# Patient Record
Sex: FEMALE | Race: WHITE | Employment: FULL TIME | URBAN - METROPOLITAN AREA
[De-identification: names, ages, dates, MRNs, and addresses within clinical notes are randomized per-mention and may not be internally consistent; named-entity substitution may affect disease eponyms.]

---

## 2018-03-26 LAB — TSH SERPL DL<=0.05 MIU/L-ACNC: 0.45 UIU/ML

## 2018-03-29 ENCOUNTER — OFFICE VISIT (OUTPATIENT)
Dept: PRIMARY CARE CLINIC | Age: 58
End: 2018-03-29
Payer: COMMERCIAL

## 2018-03-29 ENCOUNTER — TELEPHONE (OUTPATIENT)
Dept: ADMINISTRATIVE | Age: 58
End: 2018-03-29

## 2018-03-29 VITALS
HEART RATE: 74 BPM | OXYGEN SATURATION: 98 % | WEIGHT: 146 LBS | TEMPERATURE: 97.7 F | HEIGHT: 67 IN | BODY MASS INDEX: 22.91 KG/M2 | DIASTOLIC BLOOD PRESSURE: 60 MMHG | SYSTOLIC BLOOD PRESSURE: 102 MMHG

## 2018-03-29 DIAGNOSIS — J45.40 MODERATE PERSISTENT ASTHMATIC BRONCHITIS WITHOUT COMPLICATION: Primary | ICD-10-CM

## 2018-03-29 DIAGNOSIS — J45.30 MILD PERSISTENT ASTHMA WITHOUT COMPLICATION: ICD-10-CM

## 2018-03-29 PROCEDURE — 99213 OFFICE O/P EST LOW 20 MIN: CPT | Performed by: FAMILY MEDICINE

## 2018-03-29 RX ORDER — AZITHROMYCIN 250 MG/1
TABLET, FILM COATED ORAL
Qty: 1 PACKET | Refills: 0 | Status: SHIPPED | OUTPATIENT
Start: 2018-03-29 | End: 2018-05-25 | Stop reason: ALTCHOICE

## 2018-03-29 RX ORDER — PREDNISONE 10 MG/1
TABLET ORAL
Qty: 21 TABLET | Refills: 0 | Status: SHIPPED | OUTPATIENT
Start: 2018-03-29 | End: 2018-05-25 | Stop reason: ALTCHOICE

## 2018-03-29 ASSESSMENT — ENCOUNTER SYMPTOMS
SINUS PRESSURE: 0
NAUSEA: 0
EYE ITCHING: 0
BLOOD IN STOOL: 0
SINUS PAIN: 0
DIARRHEA: 0
CHEST TIGHTNESS: 1
SPUTUM PRODUCTION: 1
SHORTNESS OF BREATH: 1
RHINORRHEA: 0
CONSTIPATION: 0
WHEEZING: 1
VOMITING: 0
SORE THROAT: 1
ORTHOPNEA: 0
COUGH: 1
ABDOMINAL PAIN: 0

## 2018-03-29 NOTE — PROGRESS NOTES
swelling and PND. Gastrointestinal: Negative for abdominal pain, blood in stool, constipation, diarrhea, nausea and vomiting. Musculoskeletal: Negative for neck pain. Neurological: Negative for headaches. All other review of systems reviewed and are negative    OBJECTIVE:  /60   Pulse 74   Temp 97.7 °F (36.5 °C)   Ht 5' 6.5\" (1.689 m)   Wt 146 lb (66.2 kg)   LMP  (Approximate)   SpO2 98%   BMI 23.21 kg/m²      Physical Exam   Constitutional: She is oriented to person, place, and time. She appears well-developed and well-nourished. HENT:   Head: Normocephalic and atraumatic. Eyes: Pupils are equal, round, and reactive to light. Neck: Normal range of motion. Neck supple. No JVD present. No tracheal deviation present. No thyromegaly present. Cardiovascular: Normal rate, regular rhythm and normal heart sounds. Exam reveals no gallop and no friction rub. No murmur heard. Pulmonary/Chest: Effort normal and breath sounds normal. No respiratory distress. She has no wheezes. She has no rales. She exhibits no tenderness. Abdominal: Soft. Bowel sounds are normal. She exhibits no distension and no mass. There is no tenderness. There is no rebound and no guarding. Musculoskeletal: Normal range of motion. Lymphadenopathy:     She has no cervical adenopathy. Neurological: She is alert and oriented to person, place, and time. Skin: Skin is warm and dry. ASSESSMENT AND PLAN:    Emily Lynch was seen today for shortness of breath, asthma and wheezing. Diagnoses and all orders for this visit:    Moderate persistent asthmatic bronchitis without complication  -     azithromycin (ZITHROMAX Z-PEACE) 250 MG tablet; Take 2 pills on day 1, then 1 pill days 2-5. Mild persistent asthma without complication  -     predniSONE (DELTASONE) 10 MG tablet; 4 daily for 2 days, then 3 daily for 2 days, then 2 daily for 2 days, then 1 daily for 2 days, then 1/2 daily for 2days.     - continue advair    Return if symptoms worsen or fail to improve. I reviewed the students documentation ( Hx, exam, MDM ), examined the patient and performed the A&P.     Wocjiech Irvin, DO

## 2018-05-25 ENCOUNTER — OFFICE VISIT (OUTPATIENT)
Dept: PRIMARY CARE CLINIC | Age: 58
End: 2018-05-25
Payer: COMMERCIAL

## 2018-05-25 VITALS
BODY MASS INDEX: 24.48 KG/M2 | SYSTOLIC BLOOD PRESSURE: 110 MMHG | WEIGHT: 154 LBS | DIASTOLIC BLOOD PRESSURE: 80 MMHG | HEART RATE: 66 BPM | OXYGEN SATURATION: 97 % | TEMPERATURE: 98.2 F

## 2018-05-25 DIAGNOSIS — J45.30 MILD PERSISTENT ASTHMA WITHOUT COMPLICATION: Primary | ICD-10-CM

## 2018-05-25 DIAGNOSIS — F41.1 GAD (GENERALIZED ANXIETY DISORDER): ICD-10-CM

## 2018-05-25 PROCEDURE — 90732 PPSV23 VACC 2 YRS+ SUBQ/IM: CPT | Performed by: FAMILY MEDICINE

## 2018-05-25 PROCEDURE — 99213 OFFICE O/P EST LOW 20 MIN: CPT | Performed by: FAMILY MEDICINE

## 2018-05-25 PROCEDURE — 90471 IMMUNIZATION ADMIN: CPT | Performed by: FAMILY MEDICINE

## 2018-05-25 ASSESSMENT — ENCOUNTER SYMPTOMS
SHORTNESS OF BREATH: 0
DIFFICULTY BREATHING: 0
CHEST TIGHTNESS: 0
WHEEZING: 0
COUGH: 0

## 2018-05-25 ASSESSMENT — PATIENT HEALTH QUESTIONNAIRE - PHQ9
2. FEELING DOWN, DEPRESSED OR HOPELESS: 0
SUM OF ALL RESPONSES TO PHQ QUESTIONS 1-9: 0
1. LITTLE INTEREST OR PLEASURE IN DOING THINGS: 0
SUM OF ALL RESPONSES TO PHQ9 QUESTIONS 1 & 2: 0

## 2018-11-30 ENCOUNTER — OFFICE VISIT (OUTPATIENT)
Dept: PRIMARY CARE CLINIC | Age: 58
End: 2018-11-30
Payer: COMMERCIAL

## 2018-11-30 VITALS
WEIGHT: 152.5 LBS | OXYGEN SATURATION: 98 % | TEMPERATURE: 97.8 F | HEART RATE: 76 BPM | BODY MASS INDEX: 24.51 KG/M2 | DIASTOLIC BLOOD PRESSURE: 80 MMHG | SYSTOLIC BLOOD PRESSURE: 120 MMHG | HEIGHT: 66 IN

## 2018-11-30 DIAGNOSIS — J01.30 ACUTE NON-RECURRENT SPHENOIDAL SINUSITIS: ICD-10-CM

## 2018-11-30 DIAGNOSIS — J02.9 PHARYNGITIS, UNSPECIFIED ETIOLOGY: Primary | ICD-10-CM

## 2018-11-30 DIAGNOSIS — F41.1 GAD (GENERALIZED ANXIETY DISORDER): ICD-10-CM

## 2018-11-30 PROCEDURE — 99213 OFFICE O/P EST LOW 20 MIN: CPT | Performed by: FAMILY MEDICINE

## 2018-11-30 RX ORDER — AZITHROMYCIN 250 MG/1
TABLET, FILM COATED ORAL
Qty: 1 PACKET | Refills: 0 | Status: SHIPPED | OUTPATIENT
Start: 2018-11-30 | End: 2019-03-15

## 2018-11-30 RX ORDER — AZITHROMYCIN 250 MG/1
TABLET, FILM COATED ORAL
Qty: 1 PACKET | Refills: 0 | Status: SHIPPED | OUTPATIENT
Start: 2018-11-30 | End: 2018-11-30

## 2018-11-30 ASSESSMENT — ENCOUNTER SYMPTOMS
SWOLLEN GLANDS: 1
SORE THROAT: 1
SINUS PRESSURE: 1
SHORTNESS OF BREATH: 0
COUGH: 1
WHEEZING: 0

## 2018-11-30 NOTE — PROGRESS NOTES
Lane Oliva, a female of 66 y.o. came to the office 11/30/2018. Patient Active Problem List   Diagnosis    Mild persistent asthma without complication          Pharyngitis   This is a new problem. The current episode started in the past 7 days (6). The problem has been gradually worsening. Associated symptoms include coughing (mostly dry but with some mucus in am that is light yellow. ), fatigue, myalgias, a sore throat (and pain with swallowing and eating and drinking. ) and swollen glands. Pertinent negatives include no chills, congestion, fever or rash. Treatments tried: claritin, tylenol cold. Generalized Body Aches   Associated symptoms include coughing (mostly dry but with some mucus in am that is light yellow. ), fatigue, myalgias, a sore throat (and pain with swallowing and eating and drinking. ) and swollen glands. Pertinent negatives include no chills, congestion, fever or rash. psych: moods doing well on Zoloft. Allergies   Allergen Reactions    Pcn [Penicillins]     Sulfa Antibiotics        Current Outpatient Prescriptions on File Prior to Visit   Medication Sig Dispense Refill    ADVAIR DISKUS 100-50 MCG/DOSE diskus inhaler USE 1 INHALATION TWICE A  each 1    SYNTHROID 137 MCG tablet Take 137 mcg by mouth Daily       albuterol (PROVENTIL) (2.5 MG/3ML) 0.083% nebulizer solution Take 3 mLs by nebulization every 6 hours as needed for Wheezing or Shortness of Breath 360 mL 0    albuterol sulfate HFA (PROVENTIL HFA) 108 (90 BASE) MCG/ACT inhaler Inhale 2 puffs into the lungs every 6 hours as needed for Wheezing 1 Inhaler 3    vitamin D (CHOLECALCIFEROL) 1000 UNIT TABS tablet Take 1,000 Units by mouth daily      Multiple Vitamin (THERA) TABS Take  by mouth.  Calcium Carbonate-Vit D-Min (CALCIUM 1200 PO) Take  by mouth. No current facility-administered medications on file prior to visit. Review of Systems   Constitutional: Positive for fatigue.  Negative for chills and fever. HENT: Positive for postnasal drip, sinus pressure and sore throat (and pain with swallowing and eating and drinking. ). Negative for congestion. Respiratory: Positive for cough (mostly dry but with some mucus in am that is light yellow. ). Negative for shortness of breath and wheezing. Musculoskeletal: Positive for myalgias. Skin: Negative for rash. All other review of systems reviewed and are negative    OBJECTIVE:  /80 (Site: Left Upper Arm, Position: Sitting, Cuff Size: Large Adult)   Pulse 76   Temp 97.8 °F (36.6 °C) (Infrared)   Ht 5' 6\" (1.676 m)   Wt 152 lb 8 oz (69.2 kg)   LMP  (Approximate)   SpO2 98%   BMI 24.61 kg/m²      Physical Exam   Constitutional: No distress. HENT:   Right Ear: Tympanic membrane normal.   Left Ear: Tympanic membrane normal.   Nose: Mucosal edema (left with clear mucus. ) present. No rhinorrhea. Right sinus exhibits maxillary sinus tenderness. Right sinus exhibits no frontal sinus tenderness. Left sinus exhibits maxillary sinus tenderness. Left sinus exhibits no frontal sinus tenderness. Mouth/Throat: Uvula is midline, oropharynx is clear and moist and mucous membranes are normal. No oropharyngeal exudate or posterior oropharyngeal erythema. Neck: Neck supple. Cardiovascular: Normal rate and regular rhythm. Pulmonary/Chest: Effort normal and breath sounds normal.   Lymphadenopathy:     She has no cervical adenopathy. ASSESSMENT AND PLAN:    Elba Lewis was seen today for hypothyroidism, pharyngitis and generalized body aches. Diagnoses and all orders for this visit:    Pharyngitis, unspecified etiology    JACEY (generalized anxiety disorder)  -     sertraline (ZOLOFT) 50 MG tablet; TAKE 1 TABLET DAILY    Acute non-recurrent sphenoidal sinusitis  -     Discontinue: azithromycin (ZITHROMAX Z-PEACE) 250 MG tablet; Take 2 pills on day 1, then 1 pill days 2-5.  -     azithromycin (ZITHROMAX Z-PEACE) 250 MG tablet;  Take 2 pills on day

## 2018-12-03 ENCOUNTER — TELEPHONE (OUTPATIENT)
Dept: PRIMARY CARE CLINIC | Age: 58
End: 2018-12-03

## 2018-12-03 RX ORDER — VALACYCLOVIR HYDROCHLORIDE 1 G/1
2000 TABLET, FILM COATED ORAL 2 TIMES DAILY
Qty: 20 TABLET | Refills: 0 | Status: SHIPPED
Start: 2018-12-03 | End: 2021-02-11 | Stop reason: SDUPTHER

## 2018-12-03 NOTE — TELEPHONE ENCOUNTER
Patient was in last week to see you for sick visit and this weekend developed a lot of cold sores and in the past you gave a rx for valtrex would like a rx ,thanks

## 2019-03-15 ENCOUNTER — OFFICE VISIT (OUTPATIENT)
Dept: PRIMARY CARE CLINIC | Age: 59
End: 2019-03-15
Payer: COMMERCIAL

## 2019-03-15 ENCOUNTER — HOSPITAL ENCOUNTER (OUTPATIENT)
Age: 59
Discharge: HOME | End: 2019-03-17
Payer: COMMERCIAL

## 2019-03-15 VITALS
BODY MASS INDEX: 24.86 KG/M2 | HEART RATE: 77 BPM | OXYGEN SATURATION: 98 % | WEIGHT: 154 LBS | DIASTOLIC BLOOD PRESSURE: 84 MMHG | TEMPERATURE: 97.2 F | SYSTOLIC BLOOD PRESSURE: 130 MMHG

## 2019-03-15 DIAGNOSIS — M25.50 ARTHRALGIA, UNSPECIFIED JOINT: ICD-10-CM

## 2019-03-15 DIAGNOSIS — M25.50 ARTHRALGIA, UNSPECIFIED JOINT: Primary | ICD-10-CM

## 2019-03-15 DIAGNOSIS — R49.0 HOARSENESS OF VOICE: ICD-10-CM

## 2019-03-15 LAB
C-REACTIVE PROTEIN: <0.1 MG/DL (ref 0–0.4)
RHEUMATOID FACTOR: <10 IU/ML (ref 0–13)

## 2019-03-15 PROCEDURE — 85651 RBC SED RATE NONAUTOMATED: CPT

## 2019-03-15 PROCEDURE — 86038 ANTINUCLEAR ANTIBODIES: CPT

## 2019-03-15 PROCEDURE — 86431 RHEUMATOID FACTOR QUANT: CPT

## 2019-03-15 PROCEDURE — 99213 OFFICE O/P EST LOW 20 MIN: CPT | Performed by: FAMILY MEDICINE

## 2019-03-15 PROCEDURE — 86140 C-REACTIVE PROTEIN: CPT

## 2019-03-15 RX ORDER — CLOBETASOL PROPIONATE 0.5 MG/G
CREAM TOPICAL
Refills: 0 | COMMUNITY
Start: 2019-03-06

## 2019-03-15 ASSESSMENT — PATIENT HEALTH QUESTIONNAIRE - PHQ9
SUM OF ALL RESPONSES TO PHQ QUESTIONS 1-9: 0
1. LITTLE INTEREST OR PLEASURE IN DOING THINGS: 0
2. FEELING DOWN, DEPRESSED OR HOPELESS: 0
SUM OF ALL RESPONSES TO PHQ QUESTIONS 1-9: 0
SUM OF ALL RESPONSES TO PHQ9 QUESTIONS 1 & 2: 0

## 2019-03-15 ASSESSMENT — ENCOUNTER SYMPTOMS
SHORTNESS OF BREATH: 0
TROUBLE SWALLOWING: 0
VOICE CHANGE: 1
SORE THROAT: 0

## 2019-03-16 LAB — SEDIMENTATION RATE, ERYTHROCYTE: 3 MM/HR (ref 0–20)

## 2019-03-18 LAB — ANTI-NUCLEAR ANTIBODY (ANA): NEGATIVE

## 2019-03-19 ENCOUNTER — TELEPHONE (OUTPATIENT)
Dept: PRIMARY CARE CLINIC | Age: 59
End: 2019-03-19

## 2019-05-10 ENCOUNTER — OFFICE VISIT (OUTPATIENT)
Dept: PRIMARY CARE CLINIC | Age: 59
End: 2019-05-10
Payer: COMMERCIAL

## 2019-05-10 VITALS
OXYGEN SATURATION: 98 % | HEART RATE: 75 BPM | WEIGHT: 153 LBS | SYSTOLIC BLOOD PRESSURE: 110 MMHG | BODY MASS INDEX: 24.69 KG/M2 | TEMPERATURE: 98.2 F | DIASTOLIC BLOOD PRESSURE: 80 MMHG

## 2019-05-10 DIAGNOSIS — J45.30 MILD PERSISTENT ASTHMA WITHOUT COMPLICATION: Primary | ICD-10-CM

## 2019-05-10 DIAGNOSIS — F41.1 GAD (GENERALIZED ANXIETY DISORDER): ICD-10-CM

## 2019-05-10 PROCEDURE — 99213 OFFICE O/P EST LOW 20 MIN: CPT | Performed by: FAMILY MEDICINE

## 2019-05-10 RX ORDER — ALBUTEROL SULFATE 90 UG/1
2 AEROSOL, METERED RESPIRATORY (INHALATION) EVERY 6 HOURS PRN
Qty: 2 INHALER | Refills: 3 | Status: SHIPPED
Start: 2019-05-10 | End: 2022-02-17 | Stop reason: SDUPTHER

## 2019-05-10 ASSESSMENT — ENCOUNTER SYMPTOMS
SHORTNESS OF BREATH: 0
CHEST TIGHTNESS: 0
DIFFICULTY BREATHING: 0
ABDOMINAL PAIN: 0
COUGH: 0
WHEEZING: 0

## 2019-05-10 NOTE — PROGRESS NOTES
Deena Raymond, a female of 66 y.o. came to the office 5/10/2019. Patient Active Problem List   Diagnosis    Mild persistent asthma without complication          Asthma   There is no chest tightness, cough, difficulty breathing, shortness of breath or wheezing. This is a chronic problem. The current episode started more than 1 year ago. The problem has been rapidly improving. Relieved by: dietary changes over past week with no dairy and grains except oats and feels her breathing is much better. Her symptoms are not alleviated by steroid inhaler and beta-agonist (Advair once daily. ). Her past medical history is significant for asthma.    moods: good. Hoarseness: improved with Pepcid. Allergies   Allergen Reactions    Pcn [Penicillins]     Sulfa Antibiotics        Current Outpatient Medications on File Prior to Visit   Medication Sig Dispense Refill    clobetasol (TEMOVATE) 0.05 % cream APPLY TO LEG AND HANDS TWICE A DAY FOR 7 TO 10 DAYS,THEN AS NEEDED.  0    valACYclovir (VALTREX) 1 g tablet Take 2 tablets by mouth 2 times daily For 1 day 20 tablet 0    ADVAIR DISKUS 100-50 MCG/DOSE diskus inhaler USE 1 INHALATION TWICE A  each 1    SYNTHROID 137 MCG tablet Take 137 mcg by mouth Daily       albuterol (PROVENTIL) (2.5 MG/3ML) 0.083% nebulizer solution Take 3 mLs by nebulization every 6 hours as needed for Wheezing or Shortness of Breath 360 mL 0    vitamin D (CHOLECALCIFEROL) 1000 UNIT TABS tablet Take 1,000 Units by mouth daily      Multiple Vitamin (THERA) TABS Take  by mouth.  Calcium Carbonate-Vit D-Min (CALCIUM 1200 PO) Take  by mouth. No current facility-administered medications on file prior to visit. Review of Systems   Respiratory: Negative for cough, shortness of breath and wheezing. Gastrointestinal: Negative for abdominal pain. Psychiatric/Behavioral: Negative for decreased concentration, dysphoric mood and sleep disturbance (good with Melatonin. ).  The patient is not nervous/anxious. All other review of systems reviewed and are negative    OBJECTIVE:  /80   Pulse 75   Temp 98.2 °F (36.8 °C)   Wt 153 lb (69.4 kg)   LMP  (Approximate)   SpO2 98%   BMI 24.69 kg/m²      Physical Exam   Constitutional: She is oriented to person, place, and time. She appears well-nourished. Eyes: Conjunctivae are normal. No scleral icterus. Neck: Neck supple. Carotid bruit is not present. No thyromegaly present. Cardiovascular: Normal rate and regular rhythm. No murmur heard. Pulmonary/Chest: Effort normal and breath sounds normal. She has no wheezes. She has no rales. Abdominal: Soft. Bowel sounds are normal. She exhibits no mass. There is no tenderness. There is no rebound and no guarding. Musculoskeletal: Normal range of motion. She exhibits no edema. Lymphadenopathy:     She has no cervical adenopathy. Neurological: She is alert and oriented to person, place, and time. Skin: Skin is warm and dry. Psychiatric: She has a normal mood and affect. Vitals reviewed. ASSESSMENT AND PLAN:    Barbara Rivera was seen today for medication check. Diagnoses and all orders for this visit:    Mild persistent asthma without complication  -     albuterol sulfate HFA (PROVENTIL HFA) 108 (90 Base) MCG/ACT inhaler; Inhale 2 puffs into the lungs every 6 hours as needed for Wheezing    JACEY (generalized anxiety disorder)  -     sertraline (ZOLOFT) 50 MG tablet; TAKE 1 TABLET DAILY    - ok to try off Advair as long as she feels good with this current diet. - continue zoloft. Return if symptoms worsen or fail to improve.     Obi Irvin, DO

## 2019-10-12 DIAGNOSIS — F41.1 GAD (GENERALIZED ANXIETY DISORDER): ICD-10-CM

## 2019-10-15 DIAGNOSIS — F41.1 GAD (GENERALIZED ANXIETY DISORDER): ICD-10-CM

## 2019-11-12 LAB
VITAMIN D 25-HYDROXY: NORMAL
VITAMIN D2, 25 HYDROXY: NORMAL
VITAMIN D3,25 HYDROXY: NORMAL

## 2019-12-08 ENCOUNTER — PATIENT MESSAGE (OUTPATIENT)
Dept: PRIMARY CARE CLINIC | Age: 59
End: 2019-12-08

## 2019-12-08 DIAGNOSIS — F41.1 GAD (GENERALIZED ANXIETY DISORDER): ICD-10-CM

## 2020-02-06 ENCOUNTER — HOSPITAL ENCOUNTER (OUTPATIENT)
Age: 60
Discharge: HOME | End: 2020-02-08
Payer: COMMERCIAL

## 2020-02-06 ENCOUNTER — OFFICE VISIT (OUTPATIENT)
Dept: PRIMARY CARE CLINIC | Age: 60
End: 2020-02-06
Payer: COMMERCIAL

## 2020-02-06 VITALS
SYSTOLIC BLOOD PRESSURE: 120 MMHG | BODY MASS INDEX: 22.92 KG/M2 | DIASTOLIC BLOOD PRESSURE: 74 MMHG | WEIGHT: 142 LBS | OXYGEN SATURATION: 99 % | TEMPERATURE: 97.2 F | HEART RATE: 63 BPM

## 2020-02-06 PROCEDURE — 86003 ALLG SPEC IGE CRUDE XTRC EA: CPT

## 2020-02-06 PROCEDURE — 99396 PREV VISIT EST AGE 40-64: CPT | Performed by: FAMILY MEDICINE

## 2020-02-06 ASSESSMENT — PATIENT HEALTH QUESTIONNAIRE - PHQ9
SUM OF ALL RESPONSES TO PHQ QUESTIONS 1-9: 0
1. LITTLE INTEREST OR PLEASURE IN DOING THINGS: 0
SUM OF ALL RESPONSES TO PHQ9 QUESTIONS 1 & 2: 0
2. FEELING DOWN, DEPRESSED OR HOPELESS: 0
SUM OF ALL RESPONSES TO PHQ QUESTIONS 1-9: 0

## 2020-02-06 NOTE — PROGRESS NOTES
antibiotics    Social History     Tobacco Use    Smoking status: Never Smoker    Smokeless tobacco: Never Used   Substance Use Topics    Alcohol use: Yes     Comment: occassional         Review Of Systems:    Skin: no abnormal pigmentation, rash, scaling, itching, masses, hair or nail changes  Eyes: no blurring, diplopia, or eye pain  Ears/Nose/Throat: no hearing loss, tinnitus, vertigo, nosebleed, nasal congestion, rhinorrhea, sore throat  Respiratory: no cough, pleuritic chest pain, dyspnea, or wheezing  Cardiovascular: no chest pain, angina, dyspnea on exertion, orthopnea, PND, palpitations, or claudication  Gastrointestinal: no nausea, vomiting, heartburn, diarrhea, constipation, bloating,  abdominal pain, or blood per rectum  Genitourinary: no urinary urgency, frequency, dysuria, nocturia, hesitancy, or incontinence  Musculoskeletal: no arthritis, arthralgia, myalgia, weakness, or morning stiffness  Neurologic: no paralysis, paresis, paresthesia, seizures, tremors, or headaches  Hematologic/Lymphatic/Immunologic: no anemia, abnormal bleeding/bruising, fever, chills, night sweats, swollen glands, or unexplained weight loss  Endocrine: no heat or cold intolerance and no polyphagia, polydipsia, or polyuria  Psych: moods doing well. OBJECTIVE:    VS: /74   Pulse 63   Temp 97.2 °F (36.2 °C)   Wt 142 lb (64.4 kg)   LMP  (Approximate)   SpO2 99%   BMI 22.92 kg/m²   General appearance: Alert, Awake, Oriented times 3, no distress  Skin: Warm and dry  Head: Normocephalic. No masses, lesions or tenderness noted  Eyes: Conjunctivae appear normal. PERRL  Ears: External ears normal, TM's clear and intact  Nose/Sinuses: Nares normal. Septum midline. Mucosa normal. No drainage  Oropharynx: Oropharynx clear with no exudate seen  Neck: Neck supple. No jugular venous distension, lymphadenopathy or thyromegaly Trachea midline. No carotid bruits  Lungs: Lungs clear to auscultation bilaterally.   No rhonchi,

## 2020-02-12 ENCOUNTER — TELEPHONE (OUTPATIENT)
Dept: PRIMARY CARE CLINIC | Age: 60
End: 2020-02-12

## 2020-02-12 LAB
Lab: NORMAL
REPORT: NORMAL
THIS TEST SENT TO: NORMAL

## 2020-05-21 ENCOUNTER — TELEPHONE (OUTPATIENT)
Dept: PHARMACY | Facility: CLINIC | Age: 60
End: 2020-05-21

## 2020-05-21 ASSESSMENT — ASTHMA QUESTIONNAIRES
ACT_TOTALSCORE: 25
QUESTION_4 LAST FOUR WEEKS HOW OFTEN HAVE YOU USED YOUR RESCUE INHALER OR NEBULIZER MEDICATION (SUCH AS ALBUTEROL): 5
QUESTION_2 LAST FOUR WEEKS HOW OFTEN HAVE YOU HAD SHORTNESS OF BREATH: 5
QUESTION_3 LAST FOUR WEEKS HOW OFTEN DID YOUR ASTHMA SYMPTOMS (WHEEZING, COUGHING, SHORTNESS OF BREATH, CHEST TIGHTNESS OR PAIN) WAKE YOU UP AT NIGHT OR EARLIER THAN USUAL IN THE MORNING: 5
QUESTION_1 LAST FOUR WEEKS HOW MUCH OF THE TIME DID YOUR ASTHMA KEEP YOU FROM GETTING AS MUCH DONE AT WORK, SCHOOL OR AT HOME: 5
QUESTION_5 LAST FOUR WEEKS HOW WOULD YOU RATE YOUR ASTHMA CONTROL: 5

## 2020-05-21 NOTE — TELEPHONE ENCOUNTER
week  e. Not at all  3. During the past 4 weeks, how often did your asthma symptoms (wheezing, coughing, SOB, chest tightness or pain) wake you up at night or earlier than usual in the morning?  a. 4 or more nights a week  b. 2-3 nights a week  c. Once a week  d. Once or twice  e. Not at all  4. During the past 4 weeks, how often have you used your rescue inhaler or nebulizer medication?  a. 3 or more times per day  b. 1-2 times per day  c. 2-3 times per week  d. Once a week or less  e. Not at all  5. How would you rate your asthma control during the past 4 weeks?  a. Not controlled at all  b. Poorly controlled   c. Somewhat controlled   d. Well controlled  e. Completely controlled     Total Score = 25    PLAN:  Reminded to take Advair as prescribed through her asthma/allergy season and to start early before expects symptoms to start. Patient realized her inhalers , and after inquiring a day supply question with her mail order pharmacy plans to ask Dr. Robel Lees for a refill. Encouraged to call provider with worsening of symptoms or concerns.      Thank you,  Shell Jonas, PharmD, [address], [facility]  [facility] Pharmacist  O: [phone]  Department, toll free: [phone], option 7     CLINICAL PHARMACY CONSULT: MED RECONCILIATION/REVIEW ADDENDUM  For Pharmacy Admin Tracking Only  PHSO: Yes  Total # of Interventions Recommended: 1  - Refills Provided #: 1  - Maintenance Safety Lab Monitoring #: 1  Recommended intervention potential cost savings: 1  Total Interventions Accepted: 0  Time Spent (min): 15

## 2020-06-02 RX ORDER — LEVOTHYROXINE SODIUM 137 MCG
137 TABLET ORAL DAILY
Qty: 30 TABLET | Refills: 0 | Status: SHIPPED
Start: 2020-06-02 | End: 2020-08-06 | Stop reason: SDUPTHER

## 2020-08-06 ENCOUNTER — HOSPITAL ENCOUNTER (OUTPATIENT)
Age: 60
Discharge: HOME | End: 2020-08-08
Payer: COMMERCIAL

## 2020-08-06 ENCOUNTER — OFFICE VISIT (OUTPATIENT)
Dept: PRIMARY CARE CLINIC | Age: 60
End: 2020-08-06
Payer: COMMERCIAL

## 2020-08-06 VITALS
TEMPERATURE: 97.2 F | SYSTOLIC BLOOD PRESSURE: 124 MMHG | HEART RATE: 70 BPM | BODY MASS INDEX: 22.13 KG/M2 | HEIGHT: 67 IN | WEIGHT: 141 LBS | OXYGEN SATURATION: 96 % | DIASTOLIC BLOOD PRESSURE: 80 MMHG

## 2020-08-06 PROBLEM — F41.1 GAD (GENERALIZED ANXIETY DISORDER): Status: ACTIVE | Noted: 2020-08-06

## 2020-08-06 PROBLEM — E55.9 VITAMIN D DEFICIENCY: Status: ACTIVE | Noted: 2020-08-06

## 2020-08-06 LAB
T4 FREE: 1.81 NG/DL (ref 0.93–1.7)
TSH SERPL DL<=0.05 MIU/L-ACNC: 0.54 UIU/ML (ref 0.27–4.2)
VITAMIN D 25-HYDROXY: 70 NG/ML (ref 30–100)

## 2020-08-06 PROCEDURE — 84439 ASSAY OF FREE THYROXINE: CPT

## 2020-08-06 PROCEDURE — 99214 OFFICE O/P EST MOD 30 MIN: CPT | Performed by: FAMILY MEDICINE

## 2020-08-06 PROCEDURE — 84443 ASSAY THYROID STIM HORMONE: CPT

## 2020-08-06 PROCEDURE — 82306 VITAMIN D 25 HYDROXY: CPT

## 2020-08-06 RX ORDER — LEVOTHYROXINE SODIUM 137 MCG
137 TABLET ORAL DAILY
Qty: 90 TABLET | Refills: 1 | Status: SHIPPED
Start: 2020-08-06 | End: 2021-01-20

## 2020-08-06 ASSESSMENT — ENCOUNTER SYMPTOMS
CONSTIPATION: 0
SHORTNESS OF BREATH: 0
ROS SKIN COMMENTS: NO HAIR LOSS  NO DRY SKIN  NO BRITTLE NAILS
WHEEZING: 0
CHEST TIGHTNESS: 0
DIFFICULTY BREATHING: 0
DIARRHEA: 0

## 2020-08-06 NOTE — PROGRESS NOTES
Diana Cartagena, a female of 64 y.o. came to the office 8/6/2020. Patient Active Problem List   Diagnosis    Mild persistent asthma without complication    Hypothyroidism    FRANKIE (generalized anxiety disorder)          Asthma   There is no chest tightness, difficulty breathing, shortness of breath or wheezing. This is a chronic problem. Pertinent negatives include no appetite change. Exacerbated by: possibly gluten in foods. Her symptoms are alleviated by steroid inhaler (advair helping only needing 1 puff per day. ). She reports significant (not needing albuterol mdi at all lately.) improvement on treatment. Her past medical history is significant for asthma.    hypothyroidism: energy good on current dose of med. Had 3 bx's in past and US's by Dr. Candance Silber in past.   Frankie: anxiety doing ok on Zoloft. Allergies   Allergen Reactions    Pcn [Penicillins]     Sulfa Antibiotics        Current Outpatient Medications on File Prior to Visit   Medication Sig Dispense Refill    albuterol sulfate HFA (PROVENTIL HFA) 108 (90 Base) MCG/ACT inhaler Inhale 2 puffs into the lungs every 6 hours as needed for Wheezing 2 Inhaler 3    clobetasol (TEMOVATE) 0.05 % cream APPLY TO LEG AND HANDS TWICE A DAY FOR 7 TO 10 DAYS,THEN AS NEEDED.  0    valACYclovir (VALTREX) 1 g tablet Take 2 tablets by mouth 2 times daily For 1 day 20 tablet 0    albuterol (PROVENTIL) (2.5 MG/3ML) 0.083% nebulizer solution Take 3 mLs by nebulization every 6 hours as needed for Wheezing or Shortness of Breath 360 mL 0    vitamin D (CHOLECALCIFEROL) 1000 UNIT TABS tablet Take 1,000 Units by mouth daily      Multiple Vitamin (THERA) TABS Take  by mouth.  Calcium Carbonate-Vit D-Min (CALCIUM 1200 PO) Take  by mouth. No current facility-administered medications on file prior to visit. Review of Systems   Constitutional: Negative for activity change, appetite change and fatigue.    Respiratory: Negative for shortness of breath and wheezing. Cardiovascular: Negative for palpitations. Gastrointestinal: Negative for constipation and diarrhea. Endocrine: Negative for cold intolerance, heat intolerance, polydipsia and polyuria. Skin:        No hair loss  No dry skin  No brittle nails   Neurological: Negative for tremors. Psychiatric/Behavioral: Negative for agitation, dysphoric mood and sleep disturbance. The patient is not nervous/anxious. other review of systems reviewed and are negative    OBJECTIVE:  /80   Pulse 70   Temp 97.2 °F (36.2 °C)   Ht 5' 7\" (1.702 m)   Wt 141 lb (64 kg)   SpO2 96%   BMI 22.08 kg/m²      Physical Exam  Constitutional:       General: She is not in acute distress. Eyes:      General: No scleral icterus. Conjunctiva/sclera: Conjunctivae normal.   Neck:      Musculoskeletal: Neck supple. Thyroid: No thyromegaly. Cardiovascular:      Rate and Rhythm: Normal rate and regular rhythm. Heart sounds: No murmur. Pulmonary:      Effort: Pulmonary effort is normal.      Breath sounds: Normal breath sounds. Lymphadenopathy:      Cervical: No cervical adenopathy. Skin:     General: Skin is warm and dry. Neurological:      Mental Status: She is alert and oriented to person, place, and time. ASSESSMENT AND PLAN:    Edna Hagan was seen today for hypothyroidism and medication refill. Diagnoses and all orders for this visit:    Acquired hypothyroidism  -     SYNTHROID 137 MCG tablet; Take 1 tablet by mouth Daily  -     TSH without Reflex; Future  -     T4, Free; Future    JACEY (generalized anxiety disorder)  -     sertraline (ZOLOFT) 50 MG tablet; Take 1 tablet by mouth daily    Vitamin D deficiency  -     Vitamin D 25 Hydroxy;  Future    Mild persistent asthma without complication  -     fluticasone-salmeterol (ADVAIR DISKUS) 100-50 MCG/DOSE diskus inhaler; USE 1 INHALATION TWICE A DAY    - continue current meds     Return in about 6 months (around 2/6/2021) for annual physical

## 2020-10-27 ENCOUNTER — NURSE ONLY (OUTPATIENT)
Dept: PRIMARY CARE CLINIC | Age: 60
End: 2020-10-27
Payer: COMMERCIAL

## 2020-10-27 PROCEDURE — 90686 IIV4 VACC NO PRSV 0.5 ML IM: CPT | Performed by: FAMILY MEDICINE

## 2020-10-27 PROCEDURE — 90471 IMMUNIZATION ADMIN: CPT | Performed by: FAMILY MEDICINE

## 2021-01-11 ENCOUNTER — OFFICE VISIT (OUTPATIENT)
Dept: PRIMARY CARE CLINIC | Age: 61
End: 2021-01-11
Payer: COMMERCIAL

## 2021-01-11 VITALS
TEMPERATURE: 97.2 F | DIASTOLIC BLOOD PRESSURE: 80 MMHG | OXYGEN SATURATION: 96 % | SYSTOLIC BLOOD PRESSURE: 122 MMHG | WEIGHT: 140 LBS | BODY MASS INDEX: 21.93 KG/M2 | HEART RATE: 76 BPM

## 2021-01-11 DIAGNOSIS — S20.212A CONTUSION OF LEFT CHEST WALL, INITIAL ENCOUNTER: Primary | ICD-10-CM

## 2021-01-11 PROCEDURE — 99213 OFFICE O/P EST LOW 20 MIN: CPT | Performed by: FAMILY MEDICINE

## 2021-01-11 ASSESSMENT — ENCOUNTER SYMPTOMS
COUGH: 0
SHORTNESS OF BREATH: 0

## 2021-01-11 ASSESSMENT — PATIENT HEALTH QUESTIONNAIRE - PHQ9
SUM OF ALL RESPONSES TO PHQ QUESTIONS 1-9: 0
2. FEELING DOWN, DEPRESSED OR HOPELESS: 0
SUM OF ALL RESPONSES TO PHQ9 QUESTIONS 1 & 2: 0

## 2021-01-11 NOTE — PROGRESS NOTES
Tammy De La Paz, a female of 64 y.o. came to the office 1/11/2021. Patient Active Problem List   Diagnosis    Mild persistent asthma without complication    Hypothyroidism    JACEY (generalized anxiety disorder)    Vitamin D deficiency          Fall  The accident occurred more than 1 week ago (12/23/20 fell onto tile kitchen floor when pant leg got caught on drawer handle. ). The fall occurred while walking. She fell from a height of 3 to 5 ft. She landed on hard floor. There was no blood loss. Point of impact: chest. Pain location: chest and left lower ribs. pain into back for several days. The pain is at a severity of 6/10. The pain is moderate. Exacerbated by: lifting heavy objects. She has tried rest, NSAID and acetaminophen for the symptoms. The treatment provided moderate relief. Allergies   Allergen Reactions    Pcn [Penicillins]     Sulfa Antibiotics        Current Outpatient Medications on File Prior to Visit   Medication Sig Dispense Refill    fluticasone-salmeterol (ADVAIR DISKUS) 100-50 MCG/DOSE diskus inhaler USE 1 INHALATION TWICE A  each 1    sertraline (ZOLOFT) 50 MG tablet Take 1 tablet by mouth daily 90 tablet 1    SYNTHROID 137 MCG tablet Take 1 tablet by mouth Daily 90 tablet 1    albuterol sulfate HFA (PROVENTIL HFA) 108 (90 Base) MCG/ACT inhaler Inhale 2 puffs into the lungs every 6 hours as needed for Wheezing 2 Inhaler 3    clobetasol (TEMOVATE) 0.05 % cream APPLY TO LEG AND HANDS TWICE A DAY FOR 7 TO 10 DAYS,THEN AS NEEDED.  0    valACYclovir (VALTREX) 1 g tablet Take 2 tablets by mouth 2 times daily For 1 day 20 tablet 0    albuterol (PROVENTIL) (2.5 MG/3ML) 0.083% nebulizer solution Take 3 mLs by nebulization every 6 hours as needed for Wheezing or Shortness of Breath 360 mL 0    vitamin D (CHOLECALCIFEROL) 1000 UNIT TABS tablet Take 1,000 Units by mouth daily      Multiple Vitamin (THERA) TABS Take  by mouth.         Calcium Carbonate-Vit D-Min (CALCIUM 1200 PO) Take  by mouth. No current facility-administered medications on file prior to visit. Review of Systems   Respiratory: Negative for cough and shortness of breath. Cardiovascular: Negative for chest pain. other review of systems reviewed and are negative    OBJECTIVE:  /80   Pulse 76   Temp 97.2 °F (36.2 °C)   Wt 140 lb (63.5 kg)   SpO2 96%   BMI 21.93 kg/m²      Physical Exam  Constitutional:       General: She is not in acute distress. Appearance: Normal appearance. She is not ill-appearing, toxic-appearing or diaphoretic. Cardiovascular:      Rate and Rhythm: Normal rate. Heart sounds: No murmur. Pulmonary:      Effort: Pulmonary effort is normal.      Breath sounds: Normal breath sounds. Chest:      Chest wall: Tenderness present. Comments: Rises symmetrical with resp. Musculoskeletal:        Back:    Neurological:      Mental Status: She is alert. ASSESSMENT AND PLAN:    Liberty Solares was seen today for fall. Diagnoses and all orders for this visit:    Contusion of left chest wall, initial encounter  -     XR RIBS LEFT INCLUDE CHEST (MIN 3 VIEWS); Future    - ok for aleve prn  - moist heat to painful area. - if no fracture can follow up with chiropractor. - recommend covid vaccine    Return if symptoms worsen or fail to improve.     Shital Irvin, DO

## 2021-01-12 ENCOUNTER — TELEPHONE (OUTPATIENT)
Dept: PRIMARY CARE CLINIC | Age: 61
End: 2021-01-12

## 2021-01-20 DIAGNOSIS — F41.1 GAD (GENERALIZED ANXIETY DISORDER): ICD-10-CM

## 2021-01-20 DIAGNOSIS — E03.9 ACQUIRED HYPOTHYROIDISM: ICD-10-CM

## 2021-01-20 RX ORDER — LEVOTHYROXINE SODIUM 137 MCG
TABLET ORAL
Qty: 90 TABLET | Refills: 1 | Status: SHIPPED
Start: 2021-01-20 | End: 2021-02-16 | Stop reason: SDUPTHER

## 2021-02-11 ENCOUNTER — OFFICE VISIT (OUTPATIENT)
Dept: PRIMARY CARE CLINIC | Age: 61
End: 2021-02-11
Payer: COMMERCIAL

## 2021-02-11 VITALS
DIASTOLIC BLOOD PRESSURE: 76 MMHG | TEMPERATURE: 97.6 F | SYSTOLIC BLOOD PRESSURE: 124 MMHG | OXYGEN SATURATION: 96 % | HEART RATE: 64 BPM | WEIGHT: 140 LBS | BODY MASS INDEX: 21.93 KG/M2

## 2021-02-11 DIAGNOSIS — E03.9 ACQUIRED HYPOTHYROIDISM: ICD-10-CM

## 2021-02-11 DIAGNOSIS — J45.30 MILD PERSISTENT ASTHMA WITHOUT COMPLICATION: Primary | ICD-10-CM

## 2021-02-11 DIAGNOSIS — F41.1 GAD (GENERALIZED ANXIETY DISORDER): ICD-10-CM

## 2021-02-11 LAB
T4 FREE: 1.96 NG/DL (ref 0.93–1.7)
TSH SERPL DL<=0.05 MIU/L-ACNC: 0.36 UIU/ML (ref 0.27–4.2)

## 2021-02-11 PROCEDURE — 99214 OFFICE O/P EST MOD 30 MIN: CPT | Performed by: FAMILY MEDICINE

## 2021-02-11 RX ORDER — ALBUTEROL SULFATE 2.5 MG/3ML
2.5 SOLUTION RESPIRATORY (INHALATION) EVERY 6 HOURS PRN
Qty: 360 ML | Refills: 0 | Status: SHIPPED | OUTPATIENT
Start: 2021-02-11

## 2021-02-11 RX ORDER — VALACYCLOVIR HYDROCHLORIDE 1 G/1
2000 TABLET, FILM COATED ORAL 2 TIMES DAILY
Qty: 20 TABLET | Refills: 0 | Status: SHIPPED
Start: 2021-02-11 | End: 2022-02-17 | Stop reason: SDUPTHER

## 2021-02-11 ASSESSMENT — ENCOUNTER SYMPTOMS
DIARRHEA: 0
WHEEZING: 0
SHORTNESS OF BREATH: 0
CHEST TIGHTNESS: 0
CONSTIPATION: 0
DIFFICULTY BREATHING: 0
ROS SKIN COMMENTS: NO HAIR LOSS  NO DRY SKIN  NO BRITTLE NAILS

## 2021-02-11 NOTE — PROGRESS NOTES
Tim Cunningham, a female of 64 y.o. came to the office 2/11/2021. Patient Active Problem List   Diagnosis    Mild persistent asthma without complication    Hypothyroidism    JACEY (generalized anxiety disorder)    Vitamin D deficiency          Asthma  There is no chest tightness, difficulty breathing, shortness of breath or wheezing. This is a chronic problem. The current episode started more than 1 year ago. Pertinent negatives include no appetite change. Her symptoms are aggravated by animal exposure. Her symptoms are alleviated by steroid inhaler and beta-agonist. She reports significant improvement on treatment. Her past medical history is significant for asthma.    hypothyroidism: energy doing well. JACEY: moods doing well. Allergies   Allergen Reactions    Pcn [Penicillins]     Sulfa Antibiotics        Current Outpatient Medications on File Prior to Visit   Medication Sig Dispense Refill    SYNTHROID 137 MCG tablet TAKE 1 TABLET DAILY 90 tablet 1    fluticasone-salmeterol (ADVAIR DISKUS) 100-50 MCG/DOSE diskus inhaler USE 1 INHALATION TWICE A  each 1    albuterol sulfate HFA (PROVENTIL HFA) 108 (90 Base) MCG/ACT inhaler Inhale 2 puffs into the lungs every 6 hours as needed for Wheezing 2 Inhaler 3    clobetasol (TEMOVATE) 0.05 % cream APPLY TO LEG AND HANDS TWICE A DAY FOR 7 TO 10 DAYS,THEN AS NEEDED.  0    vitamin D (CHOLECALCIFEROL) 1000 UNIT TABS tablet Take 1,000 Units by mouth daily      Multiple Vitamin (THERA) TABS Take  by mouth.  Calcium Carbonate-Vit D-Min (CALCIUM 1200 PO) Take  by mouth. No current facility-administered medications on file prior to visit. Review of Systems   Constitutional: Negative for activity change, appetite change and fatigue. Respiratory: Negative for shortness of breath and wheezing. Gastrointestinal: Negative for constipation and diarrhea.    Endocrine: Negative for cold intolerance, heat intolerance, polydipsia and polyuria. Skin:        No hair loss  No dry skin  No brittle nails   Psychiatric/Behavioral: Negative for agitation, decreased concentration, dysphoric mood and sleep disturbance. The patient is not nervous/anxious. other review of systems reviewed and are negative    OBJECTIVE:  /76   Pulse 64   Temp 97.6 °F (36.4 °C)   Wt 140 lb (63.5 kg)   SpO2 96%   BMI 21.93 kg/m²      Physical Exam  Constitutional:       General: She is not in acute distress. Eyes:      General: No scleral icterus. Conjunctiva/sclera: Conjunctivae normal.   Neck:      Musculoskeletal: Neck supple. Thyroid: No thyromegaly. Cardiovascular:      Rate and Rhythm: Normal rate and regular rhythm. Heart sounds: No murmur. Pulmonary:      Effort: Pulmonary effort is normal.      Breath sounds: Normal breath sounds. Lymphadenopathy:      Cervical: No cervical adenopathy. Skin:     General: Skin is warm and dry. Neurological:      Mental Status: She is alert and oriented to person, place, and time. ASSESSMENT AND PLAN:    Celeste Brown was seen today for hypothyroidism, blood work and medication refill. Diagnoses and all orders for this visit:    Mild persistent asthma without complication  -     albuterol (PROVENTIL) (2.5 MG/3ML) 0.083% nebulizer solution; Take 3 mLs by nebulization every 6 hours as needed for Wheezing or Shortness of Breath    JACEY (generalized anxiety disorder)  -     sertraline (ZOLOFT) 50 MG tablet; Take 1 tablet by mouth daily    Acquired hypothyroidism  -     T4, Free; Future  -     TSH without Reflex; Future    Other orders  -     valACYclovir (VALTREX) 1 g tablet; Take 2 tablets by mouth 2 times daily For 1 day    - continue current meds     Return in about 6 months (around 8/11/2021), or if symptoms worsen or fail to improve.     Erica Irvin, DO

## 2021-02-16 DIAGNOSIS — E03.9 ACQUIRED HYPOTHYROIDISM: ICD-10-CM

## 2021-02-16 RX ORDER — LEVOTHYROXINE SODIUM 137 MCG
137 TABLET ORAL DAILY
Qty: 90 TABLET | Refills: 1 | Status: SHIPPED
Start: 2021-02-16 | End: 2021-08-12 | Stop reason: SDUPTHER

## 2021-03-14 ENCOUNTER — IMMUNIZATION (OUTPATIENT)
Dept: PRIMARY CARE CLINIC | Age: 61
End: 2021-03-14
Payer: COMMERCIAL

## 2021-03-14 PROCEDURE — 91300 COVID-19, PFIZER VACCINE 30MCG/0.3ML DOSE: CPT | Performed by: EMERGENCY MEDICINE

## 2021-03-14 PROCEDURE — 0001A COVID-19, PFIZER VACCINE 30MCG/0.3ML DOSE: CPT | Performed by: EMERGENCY MEDICINE

## 2021-04-07 ENCOUNTER — IMMUNIZATION (OUTPATIENT)
Dept: PRIMARY CARE CLINIC | Age: 61
End: 2021-04-07
Payer: COMMERCIAL

## 2021-04-07 PROCEDURE — 0002A COVID-19, PFIZER VACCINE 30MCG/0.3ML DOSE: CPT | Performed by: NURSE PRACTITIONER

## 2021-04-07 PROCEDURE — 91300 COVID-19, PFIZER VACCINE 30MCG/0.3ML DOSE: CPT | Performed by: NURSE PRACTITIONER

## 2021-05-10 ENCOUNTER — OFFICE VISIT (OUTPATIENT)
Dept: PRIMARY CARE CLINIC | Age: 61
End: 2021-05-10
Payer: COMMERCIAL

## 2021-05-10 VITALS
TEMPERATURE: 97 F | SYSTOLIC BLOOD PRESSURE: 122 MMHG | OXYGEN SATURATION: 98 % | DIASTOLIC BLOOD PRESSURE: 80 MMHG | HEART RATE: 68 BPM | HEIGHT: 67 IN | WEIGHT: 144 LBS | BODY MASS INDEX: 22.6 KG/M2

## 2021-05-10 DIAGNOSIS — N39.0 URINARY TRACT INFECTION WITHOUT HEMATURIA, SITE UNSPECIFIED: Primary | ICD-10-CM

## 2021-05-10 LAB
APPEARANCE FLUID: NORMAL
BILIRUBIN, POC: NORMAL
BLOOD URINE, POC: NORMAL
CLARITY, POC: CLEAR
COLOR, POC: YELLOW
GLUCOSE URINE, POC: NORMAL
KETONES, POC: NORMAL
LEUKOCYTE EST, POC: NORMAL
NITRITE, POC: NEGATIVE
PH, POC: 6
PROTEIN, POC: 100
SPECIFIC GRAVITY, POC: 1.03
UROBILINOGEN, POC: 0.2

## 2021-05-10 PROCEDURE — 99213 OFFICE O/P EST LOW 20 MIN: CPT | Performed by: FAMILY MEDICINE

## 2021-05-10 PROCEDURE — 81002 URINALYSIS NONAUTO W/O SCOPE: CPT | Performed by: FAMILY MEDICINE

## 2021-05-10 RX ORDER — CIPROFLOXACIN 250 MG/1
250 TABLET, FILM COATED ORAL 2 TIMES DAILY
Qty: 6 TABLET | Refills: 0 | Status: SHIPPED | OUTPATIENT
Start: 2021-05-10 | End: 2021-05-13

## 2021-05-10 RX ORDER — PHENAZOPYRIDINE HYDROCHLORIDE 200 MG/1
200 TABLET, FILM COATED ORAL 3 TIMES DAILY PRN
Qty: 6 TABLET | Refills: 0 | Status: SHIPPED
Start: 2021-05-10 | End: 2021-08-12

## 2021-05-10 ASSESSMENT — ENCOUNTER SYMPTOMS
SHORTNESS OF BREATH: 0
SORE THROAT: 0
ABDOMINAL PAIN: 0
BLOOD IN STOOL: 0
TROUBLE SWALLOWING: 0
VOMITING: 0
BACK PAIN: 0
NAUSEA: 0
COUGH: 0

## 2021-05-10 NOTE — PROGRESS NOTES
Bradley Bob, a female of 64 y.o. came to the office 5/10/2021. Patient Active Problem List   Diagnosis    Mild persistent asthma without complication    Hypothyroidism    JACEY (generalized anxiety disorder)    Vitamin D deficiency          Urinary Tract Infection   This is a new problem. The current episode started today (this AM). Episode frequency: dysuria since this morning. The problem has been gradually improving. The quality of the pain is described as burning (still burning). The pain is mild. There has been no fever. Associated symptoms include frequency (around 10 episodes), hematuria (clots in AM, brown in later morning, resolved later ) and urgency. Pertinent negatives include no chills, discharge, flank pain, nausea or vomiting. She has tried nothing for the symptoms. There is no history of kidney stones or recurrent UTIs. Woke up this AM (5/10) feeling pressure in lower abdomen followed by passing clots and dysuria during first urination. Over course of day has still experienced dysuria, but blood in urine has resolved. Only lower abdomen pressure, no pain. No groin, low back or flank pain endorsed. Asthma: stable. JACEY: stable.     Allergies   Allergen Reactions    Pcn [Penicillins]     Sulfa Antibiotics        Current Outpatient Medications on File Prior to Visit   Medication Sig Dispense Refill    SYNTHROID 137 MCG tablet Take 1 tablet by mouth Daily 90 tablet 1    albuterol (PROVENTIL) (2.5 MG/3ML) 0.083% nebulizer solution Take 3 mLs by nebulization every 6 hours as needed for Wheezing or Shortness of Breath 360 mL 0    valACYclovir (VALTREX) 1 g tablet Take 2 tablets by mouth 2 times daily For 1 day 20 tablet 0    sertraline (ZOLOFT) 50 MG tablet Take 1 tablet by mouth daily 90 tablet 1    fluticasone-salmeterol (ADVAIR DISKUS) 100-50 MCG/DOSE diskus inhaler USE 1 INHALATION TWICE A  each 1    albuterol sulfate HFA (PROVENTIL HFA) 108 (90 Base) MCG/ACT inhaler Inhale 2 puffs into the lungs every 6 hours as needed for Wheezing 2 Inhaler 3    clobetasol (TEMOVATE) 0.05 % cream APPLY TO LEG AND HANDS TWICE A DAY FOR 7 TO 10 DAYS,THEN AS NEEDED.  0    vitamin D (CHOLECALCIFEROL) 1000 UNIT TABS tablet Take 1,000 Units by mouth daily      Multiple Vitamin (THERA) TABS Take  by mouth.  Calcium Carbonate-Vit D-Min (CALCIUM 1200 PO) Take  by mouth. No current facility-administered medications on file prior to visit. Review of Systems   Constitutional: Positive for fatigue. Negative for chills and fever. HENT: Negative for sore throat and trouble swallowing. Eyes: Negative for visual disturbance. Respiratory: Negative for cough and shortness of breath. Cardiovascular: Negative for chest pain, palpitations and leg swelling. Gastrointestinal: Negative for abdominal pain, blood in stool, nausea and vomiting. Genitourinary: Positive for dysuria, frequency (around 10 episodes), hematuria (clots in AM, brown in later morning, resolved later ) and urgency. Negative for difficulty urinating and flank pain. Musculoskeletal: Negative for back pain. Skin: Negative for pallor and rash. Neurological: Negative for syncope, light-headedness and headaches. Hematological: Negative for adenopathy. other review of systems reviewed and are negative    OBJECTIVE:  /80   Pulse 68   Temp 97 °F (36.1 °C)   Ht 5' 7\" (1.702 m)   Wt 144 lb (65.3 kg)   SpO2 98%   BMI 22.55 kg/m²      Physical Exam  Constitutional:       Appearance: Normal appearance. HENT:      Head: Normocephalic and atraumatic. Cardiovascular:      Rate and Rhythm: Normal rate and regular rhythm. Pulses: Normal pulses. Heart sounds: Normal heart sounds. No murmur. No gallop. Pulmonary:      Effort: Pulmonary effort is normal.      Breath sounds: Normal breath sounds. No wheezing, rhonchi or rales.    Abdominal:      General: Bowel sounds are normal. There is no distension. Palpations: Abdomen is soft. Tenderness: There is no abdominal tenderness. There is no right CVA tenderness, left CVA tenderness, guarding or rebound. Skin:     General: Skin is warm. Capillary Refill: Capillary refill takes less than 2 seconds. Neurological:      Mental Status: She is alert. Psychiatric:         Mood and Affect: Mood normal.         Behavior: Behavior normal.     Results for Linda Lomas \"CONNIE\" (MRN [id number]) as of 5/10/2021 16:12   Ref. Range 5/10/2021 15:48   Protein, UA Unknown 100   Color, UA Unknown yellow   Clarity, UA Unknown clear   Glucose, UA POC Unknown neg   Bilirubin, UA Unknown neg   Ketones, UA Unknown neg   Spec Grav, UA Unknown 1.030   pH, UA Unknown 6.0   Urobilinogen, UA Unknown 0.2   Nitrite, UA Unknown negative   Leukocytes, UA Unknown moderate   Blood, UA POC Unknown large       ASSESSMENT AND PLAN:    Roland Sicard was seen today for urinary tract infection. Diagnoses and all orders for this visit:    Urinary tract infection without hematuria, site unspecified  -     POCT Urinalysis no Micro  -     ciprofloxacin (CIPRO) 250 MG tablet; Take 1 tablet by mouth 2 times daily for 3 days  -     phenazopyridine (PYRIDIUM) 200 MG tablet; Take 1 tablet by mouth 3 times daily as needed for Pain    - push fluids   - tylenol prn . Return if symptoms worsen or fail to improve. I reviewed the students documentation ( Hx, exam, MDM ), examined the patient and performed the A&P.     Mervat Irvin DO

## 2021-08-12 ENCOUNTER — OFFICE VISIT (OUTPATIENT)
Dept: PRIMARY CARE CLINIC | Age: 61
End: 2021-08-12
Payer: COMMERCIAL

## 2021-08-12 VITALS
DIASTOLIC BLOOD PRESSURE: 74 MMHG | BODY MASS INDEX: 22.98 KG/M2 | TEMPERATURE: 96.9 F | OXYGEN SATURATION: 98 % | HEIGHT: 66 IN | HEART RATE: 83 BPM | RESPIRATION RATE: 12 BRPM | SYSTOLIC BLOOD PRESSURE: 110 MMHG | WEIGHT: 143 LBS

## 2021-08-12 DIAGNOSIS — F41.1 GAD (GENERALIZED ANXIETY DISORDER): ICD-10-CM

## 2021-08-12 DIAGNOSIS — J45.30 MILD PERSISTENT ASTHMA WITHOUT COMPLICATION: Primary | ICD-10-CM

## 2021-08-12 DIAGNOSIS — E03.9 ACQUIRED HYPOTHYROIDISM: ICD-10-CM

## 2021-08-12 PROCEDURE — 99214 OFFICE O/P EST MOD 30 MIN: CPT | Performed by: FAMILY MEDICINE

## 2021-08-12 RX ORDER — LEVOTHYROXINE SODIUM 137 MCG
137 TABLET ORAL DAILY
Qty: 90 TABLET | Refills: 1 | Status: SHIPPED
Start: 2021-08-12 | End: 2021-10-21 | Stop reason: SDUPTHER

## 2021-08-12 SDOH — ECONOMIC STABILITY: FOOD INSECURITY: WITHIN THE PAST 12 MONTHS, THE FOOD YOU BOUGHT JUST DIDN'T LAST AND YOU DIDN'T HAVE MONEY TO GET MORE.: NEVER TRUE

## 2021-08-12 SDOH — ECONOMIC STABILITY: FOOD INSECURITY: WITHIN THE PAST 12 MONTHS, YOU WORRIED THAT YOUR FOOD WOULD RUN OUT BEFORE YOU GOT MONEY TO BUY MORE.: NEVER TRUE

## 2021-08-12 ASSESSMENT — ENCOUNTER SYMPTOMS
WHEEZING: 0
CONSTIPATION: 0
DIARRHEA: 0
SHORTNESS OF BREATH: 0
COUGH: 0
ROS SKIN COMMENTS: NO HAIR LOSS  NO DRY SKIN  NO BRITTLE NAILS
DIFFICULTY BREATHING: 0

## 2021-08-12 ASSESSMENT — SOCIAL DETERMINANTS OF HEALTH (SDOH): HOW HARD IS IT FOR YOU TO PAY FOR THE VERY BASICS LIKE FOOD, HOUSING, MEDICAL CARE, AND HEATING?: NOT HARD AT ALL

## 2021-08-12 NOTE — PROGRESS NOTES
Jayed Kasper, a female of 63 y.o. came to the office 8/12/2021. Patient Active Problem List   Diagnosis    Mild persistent asthma without complication    Hypothyroidism    JACEY (generalized anxiety disorder)    Vitamin D deficiency          Asthma  There is no cough, difficulty breathing, shortness of breath or wheezing. This is a chronic problem. The current episode started more than 1 year ago. The problem occurs rarely. The problem has been rapidly improving. Pertinent negatives include no appetite change or chest pain. Her symptoms are alleviated by steroid inhaler and beta-agonist. Her past medical history is significant for asthma.    hypothyroidism: energy good. JACEY: moods ok but rough when son is having issues. He is going to see a psychiatrist. He loves his counselor. Allergies   Allergen Reactions    Pcn [Penicillins]     Sulfa Antibiotics        Current Outpatient Medications on File Prior to Visit   Medication Sig Dispense Refill    albuterol (PROVENTIL) (2.5 MG/3ML) 0.083% nebulizer solution Take 3 mLs by nebulization every 6 hours as needed for Wheezing or Shortness of Breath 360 mL 0    valACYclovir (VALTREX) 1 g tablet Take 2 tablets by mouth 2 times daily For 1 day 20 tablet 0    albuterol sulfate HFA (PROVENTIL HFA) 108 (90 Base) MCG/ACT inhaler Inhale 2 puffs into the lungs every 6 hours as needed for Wheezing 2 Inhaler 3    clobetasol (TEMOVATE) 0.05 % cream APPLY TO LEG AND HANDS TWICE A DAY FOR 7 TO 10 DAYS,THEN AS NEEDED.  0    vitamin D (CHOLECALCIFEROL) 1000 UNIT TABS tablet Take 1,000 Units by mouth daily      Multiple Vitamin (THERA) TABS Take  by mouth.  Calcium Carbonate-Vit D-Min (CALCIUM 1200 PO) Take  by mouth.  fluticasone-salmeterol (ADVAIR DISKUS) 100-50 MCG/DOSE diskus inhaler USE 1 INHALATION TWICE A DAY (Patient not taking: Reported on 8/12/2021) 180 each 1     No current facility-administered medications on file prior to visit.        Review of Systems   Constitutional: Negative for activity change, appetite change and fatigue. Respiratory: Negative for cough, shortness of breath and wheezing. Cardiovascular: Negative for chest pain and palpitations. Gastrointestinal: Negative for constipation and diarrhea. Endocrine: Negative for cold intolerance, heat intolerance, polydipsia and polyuria. Skin:        No hair loss  No dry skin  No brittle nails   Neurological: Negative for tremors. Psychiatric/Behavioral: Negative for agitation, decreased concentration, dysphoric mood and sleep disturbance. The patient is not nervous/anxious. other review of systems reviewed and are negative    OBJECTIVE:  /74   Pulse 83   Temp 96.9 °F (36.1 °C)   Resp 12   Ht 5' 6\" (1.676 m)   Wt 143 lb (64.9 kg)   SpO2 98%   BMI 23.08 kg/m²      Physical Exam  Vitals reviewed. Eyes:      General: No scleral icterus. Conjunctiva/sclera: Conjunctivae normal.   Neck:      Thyroid: No thyromegaly. Vascular: No carotid bruit. Cardiovascular:      Rate and Rhythm: Normal rate and regular rhythm. Heart sounds: No murmur heard. Pulmonary:      Effort: Pulmonary effort is normal.      Breath sounds: Normal breath sounds. No wheezing or rales. Abdominal:      General: Bowel sounds are normal.      Palpations: Abdomen is soft. There is no mass. Tenderness: There is no abdominal tenderness. There is no guarding or rebound. Musculoskeletal:         General: Normal range of motion. Cervical back: Neck supple. Lymphadenopathy:      Cervical: No cervical adenopathy. Skin:     General: Skin is warm and dry. Neurological:      Mental Status: She is alert and oriented to person, place, and time. Psychiatric:         Mood and Affect: Mood normal.         ASSESSMENT AND PLAN:    Kim Victor was seen today for 6 month follow-up and medication refill.     Diagnoses and all orders for this visit:    Mild persistent asthma without complication    JACEY (generalized anxiety disorder)  -     sertraline (ZOLOFT) 50 MG tablet; Take 1 tablet by mouth daily    Acquired hypothyroidism  -     SYNTHROID 137 MCG tablet; Take 1 tablet by mouth Daily  -     TSH without Reflex; Future  -     Lipid Panel; Future  -     Comprehensive Metabolic Panel; Future  -     CBC; Future    - use advair if starting to need albuterol more. - continue zoloft  - if tsh low will hold synthroid on Sundays or take 1/2. Return in about 6 months (around 2/12/2022) for or for acute problem.     Larry Irvin, DO

## 2021-10-21 DIAGNOSIS — E03.9 ACQUIRED HYPOTHYROIDISM: ICD-10-CM

## 2021-10-21 RX ORDER — LEVOTHYROXINE SODIUM 137 MCG
137 TABLET ORAL DAILY
Qty: 90 TABLET | Refills: 1 | Status: SHIPPED
Start: 2021-10-21 | End: 2022-02-17 | Stop reason: SDUPTHER

## 2021-10-25 ENCOUNTER — TELEPHONE (OUTPATIENT)
Dept: PRIMARY CARE CLINIC | Age: 61
End: 2021-10-25

## 2021-10-25 NOTE — TELEPHONE ENCOUNTER
----- Message from Rajendra Perla sent at 10/22/2021  5:16 PM EDT -----  Subject: Message to Provider    QUESTIONS  Information for Provider? [address] called Needs   clarification on a medication please call pharmacy they called after hours   [id number] reference number [id number]  ---------------------------------------------------------------------------  --------------  CALL BACK INFO  What is the best way for the office to contact you? OK to leave message on   voicemail  Preferred Call Back Phone Number? [phone]  ---------------------------------------------------------------------------  --------------  SCRIPT ANSWERS  Relationship to Patient? Third Party  Representative Name?  Liberty Baker

## 2021-12-02 LAB — MAMMOGRAPHY, EXTERNAL: NORMAL

## 2021-12-31 ENCOUNTER — PATIENT MESSAGE (OUTPATIENT)
Dept: PRIMARY CARE CLINIC | Age: 61
End: 2021-12-31

## 2022-01-03 NOTE — TELEPHONE ENCOUNTER
From: Durga Glez  To: Dr. Tory Powell: 12/31/2021 9:26 AM EST  Subject: Dru Armstrong,  I have been using my nebulizer for the past two days for some chest congestion. My 27year old nebulizer is not working any longer and was wondering if you could call in a prescription to [facility] for a new machine. I have the albuterol medication I need for the machine. (I have had my booster and had two negative rapid tests this week.)   I am fine with my Wixela and Albuterol inhaler at this time and not in any distress. The nebulizer helps control my cough at night. Thank you and Happy New Year! Stay well!

## 2022-01-03 NOTE — TELEPHONE ENCOUNTER
I ordered you a nebulizer send the and will send it to [facility] tomorrow.

## 2022-01-09 ENCOUNTER — PATIENT MESSAGE (OUTPATIENT)
Dept: PRIMARY CARE CLINIC | Age: 62
End: 2022-01-09

## 2022-01-09 DIAGNOSIS — R05.9 COUGH: Primary | ICD-10-CM

## 2022-01-10 RX ORDER — PROMETHAZINE HYDROCHLORIDE AND CODEINE PHOSPHATE 6.25; 1 MG/5ML; MG/5ML
5 SYRUP ORAL 4 TIMES DAILY PRN
Qty: 180 ML | Refills: 0 | Status: SHIPPED | OUTPATIENT
Start: 2022-01-10 | End: 2022-01-19

## 2022-01-10 NOTE — TELEPHONE ENCOUNTER
From: Garcia Otero  To: Dr. Chu Chema: 1/9/2022 12:53 PM EST  Subject: Still Coughing [facility] Dr. Geri Beyer,  The nebulizer is helping a lot, but I am still coughing through the night. My Covid results came in Saturday and I am negative. A few years ago (?) I had a prescription cough medicine that helped tremendously (I don't remember the name of the prescription). I wasn't sure if I had to come in for a visit, or if it could be called in to [facility]. Thank you.

## 2022-02-17 ENCOUNTER — OFFICE VISIT (OUTPATIENT)
Dept: PRIMARY CARE CLINIC | Age: 62
End: 2022-02-17
Payer: COMMERCIAL

## 2022-02-17 VITALS
TEMPERATURE: 97.3 F | DIASTOLIC BLOOD PRESSURE: 78 MMHG | SYSTOLIC BLOOD PRESSURE: 126 MMHG | WEIGHT: 146 LBS | HEART RATE: 72 BPM | BODY MASS INDEX: 23.57 KG/M2 | OXYGEN SATURATION: 97 %

## 2022-02-17 DIAGNOSIS — M25.512 LEFT SHOULDER PAIN, UNSPECIFIED CHRONICITY: ICD-10-CM

## 2022-02-17 DIAGNOSIS — J45.30 MILD PERSISTENT ASTHMA WITHOUT COMPLICATION: ICD-10-CM

## 2022-02-17 DIAGNOSIS — E03.9 ACQUIRED HYPOTHYROIDISM: Primary | ICD-10-CM

## 2022-02-17 DIAGNOSIS — F41.1 GAD (GENERALIZED ANXIETY DISORDER): ICD-10-CM

## 2022-02-17 PROCEDURE — 99214 OFFICE O/P EST MOD 30 MIN: CPT | Performed by: FAMILY MEDICINE

## 2022-02-17 RX ORDER — VALACYCLOVIR HYDROCHLORIDE 1 G/1
2000 TABLET, FILM COATED ORAL 2 TIMES DAILY
Qty: 20 TABLET | Refills: 0 | Status: SHIPPED
Start: 2022-02-17 | End: 2022-04-06

## 2022-02-17 RX ORDER — MELOXICAM 7.5 MG/1
7.5 TABLET ORAL DAILY
Qty: 30 TABLET | Refills: 1 | Status: SHIPPED
Start: 2022-02-17 | End: 2022-02-17 | Stop reason: ALTCHOICE

## 2022-02-17 RX ORDER — MELOXICAM 7.5 MG/1
7.5 TABLET ORAL DAILY
Qty: 30 TABLET | Refills: 1 | Status: SHIPPED
Start: 2022-02-17 | End: 2022-06-21

## 2022-02-17 RX ORDER — LEVOTHYROXINE SODIUM 137 MCG
137 TABLET ORAL DAILY
Qty: 90 TABLET | Refills: 1 | Status: SHIPPED
Start: 2022-02-17 | End: 2022-08-18 | Stop reason: SDUPTHER

## 2022-02-17 RX ORDER — ALBUTEROL SULFATE 90 UG/1
2 AEROSOL, METERED RESPIRATORY (INHALATION) EVERY 6 HOURS PRN
Qty: 3 EACH | Refills: 1 | Status: SHIPPED
Start: 2022-02-17 | End: 2022-08-05

## 2022-02-17 ASSESSMENT — PATIENT HEALTH QUESTIONNAIRE - PHQ9
SUM OF ALL RESPONSES TO PHQ9 QUESTIONS 1 & 2: 0
2. FEELING DOWN, DEPRESSED OR HOPELESS: 0
SUM OF ALL RESPONSES TO PHQ QUESTIONS 1-9: 0
SUM OF ALL RESPONSES TO PHQ QUESTIONS 1-9: 0
1. LITTLE INTEREST OR PLEASURE IN DOING THINGS: 0
SUM OF ALL RESPONSES TO PHQ QUESTIONS 1-9: 0
SUM OF ALL RESPONSES TO PHQ QUESTIONS 1-9: 0

## 2022-02-17 ASSESSMENT — ENCOUNTER SYMPTOMS
ROS SKIN COMMENTS: NO HAIR LOSS  NO DRY SKIN  NO BRITTLE NAILS
DIARRHEA: 0
CONSTIPATION: 0

## 2022-02-17 NOTE — PROGRESS NOTES
Corinne Merritt, a female of 63 y.o. came to the office 2/17/2022. Patient Active Problem List   Diagnosis    Mild persistent asthma without complication    Hypothyroidism    JACEY (generalized anxiety disorder)    Vitamin D deficiency          Shoulder Pain   The current episode started more than 1 month ago. There has been no history of extremity trauma. The problem has been gradually worsening. Pertinent negatives include no limited range of motion, numbness, stiffness or tingling. The symptoms are aggravated by activity. She has tried NSAIDS (exercises) for the symptoms. The treatment provided moderate relief.      endo: energy doing well. Asthma: breathing well. Nebulizer use prn. JACEY: moods are well. Allergies   Allergen Reactions    Pcn [Penicillins]     Sulfa Antibiotics        Current Outpatient Medications on File Prior to Visit   Medication Sig Dispense Refill    Nebulizers (COMPRESSOR/NEBULIZER) MISC Use as needed. 1 each 0    albuterol (PROVENTIL) (2.5 MG/3ML) 0.083% nebulizer solution Take 3 mLs by nebulization every 6 hours as needed for Wheezing or Shortness of Breath 360 mL 0    clobetasol (TEMOVATE) 0.05 % cream APPLY TO LEG AND HANDS TWICE A DAY FOR 7 TO 10 DAYS,THEN AS NEEDED.  0    vitamin D (CHOLECALCIFEROL) 1000 UNIT TABS tablet Take 1,000 Units by mouth daily      Multiple Vitamin (THERA) TABS Take  by mouth.  Calcium Carbonate-Vit D-Min (CALCIUM 1200 PO) Take  by mouth. No current facility-administered medications on file prior to visit. Review of Systems   Constitutional: Negative for activity change, appetite change and fatigue. Cardiovascular: Negative for palpitations. Gastrointestinal: Negative for constipation and diarrhea. Endocrine: Negative for cold intolerance, heat intolerance, polydipsia and polyuria. Musculoskeletal: Negative for stiffness.    Skin:        No hair loss  No dry skin  No brittle nails   Neurological: Negative for tingling, tremors and numbness. Psychiatric/Behavioral: Negative for agitation, decreased concentration, dysphoric mood and sleep disturbance. The patient is not nervous/anxious. other review of systems reviewed and are negative    OBJECTIVE:  /78   Pulse 72   Temp 97.3 °F (36.3 °C)   Wt 146 lb (66.2 kg)   SpO2 97%   BMI 23.57 kg/m²      Physical Exam  Vitals reviewed. Eyes:      General: No scleral icterus. Conjunctiva/sclera: Conjunctivae normal.   Neck:      Thyroid: No thyromegaly. Vascular: No carotid bruit. Cardiovascular:      Rate and Rhythm: Normal rate and regular rhythm. Heart sounds: No murmur heard. Pulmonary:      Effort: Pulmonary effort is normal.      Breath sounds: Normal breath sounds. No wheezing or rales. Musculoskeletal:         General: Normal range of motion. Cervical back: Neck supple. Lymphadenopathy:      Cervical: No cervical adenopathy. Skin:     General: Skin is warm and dry. Neurological:      Mental Status: She is alert and oriented to person, place, and time. Left Shoulder Exam     Tenderness   The patient is experiencing tenderness in the biceps tendon. Range of Motion   Active abduction: normal   External rotation: normal   Forward flexion: normal     Muscle Strength   The patient has normal left shoulder strength. Tests   Apprehension: positive  Impingement: positive  Drop arm: negative              ASSESSMENT AND PLAN:    Karla Lebron was seen today for hypothyroidism and 6 month follow-up. Diagnoses and all orders for this visit:    Acquired hypothyroidism  -     SYNTHROID 137 MCG tablet; Take 1 tablet by mouth Daily    JACEY (generalized anxiety disorder)  -     sertraline (ZOLOFT) 50 MG tablet;  Take 1 tablet by mouth daily    Mild persistent asthma without complication  -     fluticasone-salmeterol (ADVAIR DISKUS) 100-50 MCG/DOSE diskus inhaler; USE 1 INHALATION TWICE A DAY  -     albuterol sulfate HFA (PROVENTIL HFA) 108 (90 Base) MCG/ACT inhaler; Inhale 2 puffs into the lungs every 6 hours as needed for Wheezing    Left shoulder pain, unspecified chronicity  -     Discontinue: meloxicam (MOBIC) 7.5 MG tablet; Take 1 tablet by mouth daily  -     XR SHOULDER LEFT (MIN 2 VIEWS); Future  -     meloxicam (MOBIC) 7.5 MG tablet; Take 1 tablet by mouth daily    Other orders  -     valACYclovir (VALTREX) 1 g tablet; Take 2 tablets by mouth 2 times daily For 1 day    - continue current meds  - do shoulder exercises daily and take Mobic daily. If no change in 1 month return for injection. Return in about 6 months (around 8/17/2022), or if symptoms worsen or fail to improve.     Usama Irvin, DO

## 2022-02-22 DIAGNOSIS — E03.9 ACQUIRED HYPOTHYROIDISM: ICD-10-CM

## 2022-02-22 RX ORDER — LEVOTHYROXINE SODIUM 137 UG/1
137 TABLET ORAL DAILY
Qty: 90 TABLET | Refills: 1 | Status: CANCELLED | OUTPATIENT
Start: 2022-02-22

## 2022-02-22 NOTE — TELEPHONE ENCOUNTER
----- Message from Rhett Park sent at 2/22/2022 11:07 AM EST -----  Subject: Medication Problem    QUESTIONS  Name of Medication? SYNTHROID 137 MCG tablet  Patient-reported dosage and instructions? Take 1 tablet by mouth Daily  What question or problem do you have with the medication? [place] CALLED IN STATING DUE TO HEALTH INSURANCE NEEDS GENERIC   BRAND UPDATED SO PATIENT CAN FILL MEDICATION REFERRENCE # [id number]  Preferred Pharmacy? [address] [phone] [facility] [phone]  Pharmacy phone number (if available)? [phone]  Additional Information for Provider?   ---------------------------------------------------------------------------  --------------  CALL BACK INFO  What is the best way for the office to contact you? OK to leave message on   voicemail  Preferred Call Back Phone Number? [phone]  ---------------------------------------------------------------------------  --------------  SCRIPT ANSWERS  Relationship to Patient? Third Party  Representative Name?  Thomas Castro

## 2022-03-04 ENCOUNTER — TELEPHONE (OUTPATIENT)
Dept: PRIMARY CARE CLINIC | Age: 62
End: 2022-03-04

## 2022-03-04 NOTE — TELEPHONE ENCOUNTER
Called patient discussed x-ray of shoulder which shows mild arthritic changes at the before meals and glenohumeral joints. She states the Mobic is helping. I would now like her to do some rotator cuff exercises and continue to take the meloxicam.  Let me know in the next couple weeks how she is doing. At that time if better but not all the way we can increase the meloxicam dose to 15 mg. Also discussed possible injection in the shoulder in the future.

## 2022-04-06 RX ORDER — VALACYCLOVIR HYDROCHLORIDE 1 G/1
TABLET, FILM COATED ORAL
Qty: 20 TABLET | Refills: 0 | Status: SHIPPED | OUTPATIENT
Start: 2022-04-06

## 2022-06-21 DIAGNOSIS — M25.512 LEFT SHOULDER PAIN, UNSPECIFIED CHRONICITY: ICD-10-CM

## 2022-06-21 RX ORDER — MELOXICAM 7.5 MG/1
TABLET ORAL
Qty: 30 TABLET | Refills: 1 | Status: SHIPPED
Start: 2022-06-21 | End: 2022-09-21

## 2022-08-05 DIAGNOSIS — J45.30 MILD PERSISTENT ASTHMA WITHOUT COMPLICATION: ICD-10-CM

## 2022-08-05 RX ORDER — ALBUTEROL SULFATE 90 UG/1
AEROSOL, METERED RESPIRATORY (INHALATION)
Qty: 25.5 G | Refills: 1 | Status: SHIPPED | OUTPATIENT
Start: 2022-08-05

## 2022-08-18 ENCOUNTER — OFFICE VISIT (OUTPATIENT)
Dept: PRIMARY CARE CLINIC | Age: 62
End: 2022-08-18
Payer: COMMERCIAL

## 2022-08-18 VITALS
TEMPERATURE: 97.5 F | SYSTOLIC BLOOD PRESSURE: 128 MMHG | DIASTOLIC BLOOD PRESSURE: 68 MMHG | OXYGEN SATURATION: 98 % | WEIGHT: 148 LBS | HEART RATE: 61 BPM | BODY MASS INDEX: 23.89 KG/M2

## 2022-08-18 DIAGNOSIS — E03.9 ACQUIRED HYPOTHYROIDISM: ICD-10-CM

## 2022-08-18 DIAGNOSIS — F41.1 GAD (GENERALIZED ANXIETY DISORDER): ICD-10-CM

## 2022-08-18 DIAGNOSIS — Z00.00 ANNUAL PHYSICAL EXAM: Primary | ICD-10-CM

## 2022-08-18 DIAGNOSIS — Z00.00 ANNUAL PHYSICAL EXAM: ICD-10-CM

## 2022-08-18 LAB
ALBUMIN SERPL-MCNC: 4.9 G/DL (ref 3.5–5.2)
ALP BLD-CCNC: 84 U/L (ref 35–104)
ALT SERPL-CCNC: 22 U/L (ref 0–32)
ANION GAP SERPL CALCULATED.3IONS-SCNC: 16 MMOL/L (ref 7–16)
APPEARANCE FLUID: NORMAL
AST SERPL-CCNC: 26 U/L (ref 0–31)
BILIRUB SERPL-MCNC: 0.6 MG/DL (ref 0–1.2)
BILIRUBIN, POC: NORMAL
BLOOD URINE, POC: NORMAL
BUN BLDV-MCNC: 18 MG/DL (ref 6–23)
CALCIUM SERPL-MCNC: 10 MG/DL (ref 8.6–10.2)
CHLORIDE BLD-SCNC: 105 MMOL/L (ref 98–107)
CHOLESTEROL, TOTAL: 184 MG/DL (ref 0–199)
CLARITY, POC: NORMAL
CO2: 22 MMOL/L (ref 22–29)
COLOR, POC: YELLOW
CREAT SERPL-MCNC: 0.6 MG/DL (ref 0.5–1)
GFR AFRICAN AMERICAN: >60
GFR NON-AFRICAN AMERICAN: >60 ML/MIN/1.73
GLUCOSE BLD-MCNC: 97 MG/DL (ref 74–99)
GLUCOSE URINE, POC: NORMAL
HCT VFR BLD CALC: 46.3 % (ref 34–48)
HDLC SERPL-MCNC: 63 MG/DL
HEMOGLOBIN: 14.4 G/DL (ref 11.5–15.5)
KETONES, POC: NORMAL
LDL CHOLESTEROL CALCULATED: 97 MG/DL (ref 0–99)
LEUKOCYTE EST, POC: NORMAL
MCH RBC QN AUTO: 28.9 PG (ref 26–35)
MCHC RBC AUTO-ENTMCNC: 31.1 % (ref 32–34.5)
MCV RBC AUTO: 93 FL (ref 80–99.9)
NITRITE, POC: NORMAL
PDW BLD-RTO: 13 FL (ref 11.5–15)
PH, POC: 7
PLATELET # BLD: 292 E9/L (ref 130–450)
PMV BLD AUTO: 11.8 FL (ref 7–12)
POTASSIUM SERPL-SCNC: 4.7 MMOL/L (ref 3.5–5)
PROTEIN, POC: NORMAL
RBC # BLD: 4.98 E12/L (ref 3.5–5.5)
SODIUM BLD-SCNC: 143 MMOL/L (ref 132–146)
SPECIFIC GRAVITY, POC: 1.01
T4 FREE: 2.02 NG/DL (ref 0.93–1.7)
TOTAL PROTEIN: 7.5 G/DL (ref 6.4–8.3)
TRIGL SERPL-MCNC: 119 MG/DL (ref 0–149)
TSH SERPL DL<=0.05 MIU/L-ACNC: 0.5 UIU/ML (ref 0.27–4.2)
UROBILINOGEN, POC: 0.2
VLDLC SERPL CALC-MCNC: 24 MG/DL
WBC # BLD: 5.2 E9/L (ref 4.5–11.5)

## 2022-08-18 PROCEDURE — 99396 PREV VISIT EST AGE 40-64: CPT | Performed by: FAMILY MEDICINE

## 2022-08-18 PROCEDURE — 81002 URINALYSIS NONAUTO W/O SCOPE: CPT | Performed by: FAMILY MEDICINE

## 2022-08-18 RX ORDER — LEVOTHYROXINE SODIUM 137 MCG
137 TABLET ORAL DAILY
Qty: 90 TABLET | Refills: 1 | Status: SHIPPED
Start: 2022-08-18 | End: 2022-08-19 | Stop reason: DRUGHIGH

## 2022-08-18 NOTE — PROGRESS NOTES
SUBJECTIVE:    HPI: Jose Aguilar  Was seen here today for   Chief Complaint   Patient presents with    Annual Exam    .  She states they are dealing with rigids in nails. Rash around fingernails as well. Past Medical History:   Diagnosis Date    Asthma     Osteopenia of lumbar spine 12/02/2021    Thyroid disease        Past Surgical History:   Procedure Laterality Date    SINUS SURGERY         Family History   Problem Relation Age of Onset    Kidney Disease Mother 80        alz disease    Cancer Father 47        stomach       Prior to Admission medications    Medication Sig Start Date End Date Taking? Authorizing Provider   sertraline (ZOLOFT) 50 MG tablet Take 1 tablet by mouth daily 8/18/22  Yes Pam Riedel Economus, DO   SYNTHROID 137 MCG tablet Take 1 tablet by mouth Daily 8/18/22  Yes Dru Irvin DO   albuterol sulfate HFA (PROVENTIL;VENTOLIN;PROAIR) 108 (90 Base) MCG/ACT inhaler USE 2 INHALATIONS ORALLY   EVERY 6 HOURS AS NEEDED FORWHEEZING 8/5/22   Dru Irvin DO   meloxicam (MOBIC) 7.5 MG tablet TAKE 1 TABLET DAILY 6/21/22   Dru Irvin, DO   valACYclovir (VALTREX) 1 g tablet TAKE 2 TABLETS TWICE A DAY FOR 1 DAY 4/6/22   Dru Irvin, DO   fluticasone-salmeterol (ADVAIR DISKUS) 100-50 MCG/DOSE diskus inhaler USE 1 INHALATION TWICE A DAY 2/17/22   Dru Irvin, DO   Nebulizers (COMPRESSOR/NEBULIZER) MISC Use as needed. 1/3/22   Dru Irvin DO   albuterol (PROVENTIL) (2.5 MG/3ML) 0.083% nebulizer solution Take 3 mLs by nebulization every 6 hours as needed for Wheezing or Shortness of Breath 2/11/21   Dru Irvin, DO   clobetasol (TEMOVATE) 0.05 % cream APPLY TO LEG AND HANDS TWICE A DAY FOR 7 TO 10 DAYS,THEN AS NEEDED. 3/6/19   Historical Provider, MD   vitamin D (CHOLECALCIFEROL) 1000 UNIT TABS tablet Take 1,000 Units by mouth daily    Historical Provider, MD   Multiple Vitamin (THERA) TABS Take  by mouth.       Historical Provider, MD   Calcium Carbonate-Vit D-Min (CALCIUM 1200 PO) Take  by mouth. Historical Provider, MD       Pcn [penicillins] and Sulfa antibiotics    Social History     Tobacco Use    Smoking status: Never    Smokeless tobacco: Never   Substance Use Topics    Alcohol use: Yes     Comment: occassional         Review Of Systems:    Skin: no abnormal pigmentation, + rash, - scaling, itching, masses, hair or nail changes  Eyes: no blurring, diplopia, or eye pain  Ears/Nose/Throat: no hearing loss, tinnitus, vertigo, nosebleed, nasal congestion, rhinorrhea, sore throat  Respiratory: no cough, pleuritic chest pain, dyspnea, or wheezing  Cardiovascular: no chest pain, angina, dyspnea on exertion, orthopnea, PND, palpitations, or claudication  Gastrointestinal: no nausea, vomiting, heartburn, diarrhea, constipation, bloating,  abdominal pain, or blood per rectum  Genitourinary: no urinary urgency, frequency, dysuria, nocturia, hesitancy, or incontinence  Musculoskeletal: no arthritis, arthralgia, myalgia, weakness, or morning stiffness  Neurologic: no paralysis, paresis, paresthesia, seizures, tremors, or headaches  Hematologic/Lymphatic/Immunologic: no anemia, abnormal bleeding/bruising, fever, chills, night sweats, swollen glands, or unexplained weight loss  Endocrine: no heat or cold intolerance and no polyphagia, polydipsia, or polyuria  Psych: moods good. OBJECTIVE:    VS: /68   Pulse 61   Temp 97.5 °F (36.4 °C)   Wt 148 lb (67.1 kg)   SpO2 98%   BMI 23.89 kg/m²   General appearance: Alert, Awake, Oriented times 3, no distress  Skin: Warm and dry with faint erythematous macular rash distal nail beds dorsal surface. Slight ridges in fingernails. Head: Normocephalic. No masses, lesions or tenderness noted  Eyes: Conjunctivae appear normal. PERRL  Ears: External ears normal, TM's clear and intact  Nose/Sinuses: Nares normal. Septum midline.  Mucosa normal. No drainage  Oropharynx: Oropharynx clear with no exudate seen  Neck: Neck supple. No jugular venous distension, lymphadenopathy or thyromegaly Trachea midline. No carotid bruits  Lungs: Lungs clear to auscultation bilaterally. No rhonchi, crackles or wheezes  Heart: S1 S2  Regular rate and rhythm. No rub, murmur or gallop  Abdomen: Abdomen soft, non-tender. BS normal. No masses, organomegaly  Extremities: No edema, Peripheral pulses palpable  Musculoskeletal: Muscular strength appears intact. No joint effusion, tenderness, swelling or warmth  Neuro:  No focal motor or sensory deficits. 2+/4 L4 reflexes        ASSESSMENT/PLAN:  Dwayne Colorado was seen today for annual exam.    Diagnoses and all orders for this visit:    Annual physical exam  -     Lipid Panel; Future  -     CBC; Future  -     Comprehensive Metabolic Panel; Future  -     POCT Urinalysis no Micro    JACEY (generalized anxiety disorder)  -     sertraline (ZOLOFT) 50 MG tablet; Take 1 tablet by mouth daily    Acquired hypothyroidism  -     SYNTHROID 137 MCG tablet; Take 1 tablet by mouth Daily  -     T4, Free; Future  -     TSH; Future     - continue current meds and doses  - monitor skin and fingernails. Return in about 6 months (around 2/18/2023) for or for acute problem.     Laurent Irvin, DO

## 2022-08-19 ENCOUNTER — TELEPHONE (OUTPATIENT)
Dept: PRIMARY CARE CLINIC | Age: 62
End: 2022-08-19

## 2022-08-19 RX ORDER — LEVOTHYROXINE SODIUM 0.12 MG/1
125 TABLET ORAL DAILY
Qty: 90 TABLET | Refills: 1 | Status: SHIPPED
Start: 2022-08-19 | End: 2022-10-10 | Stop reason: SDUPTHER

## 2022-09-21 DIAGNOSIS — M25.512 LEFT SHOULDER PAIN, UNSPECIFIED CHRONICITY: ICD-10-CM

## 2022-09-21 RX ORDER — MELOXICAM 7.5 MG/1
TABLET ORAL
Qty: 90 TABLET | Refills: 0 | Status: SHIPPED | OUTPATIENT
Start: 2022-09-21

## 2022-10-10 RX ORDER — LEVOTHYROXINE SODIUM 0.12 MG/1
125 TABLET ORAL DAILY
Qty: 90 TABLET | Refills: 1 | Status: SHIPPED | OUTPATIENT
Start: 2022-10-10

## 2023-01-20 ENCOUNTER — OFFICE VISIT (OUTPATIENT)
Dept: PRIMARY CARE CLINIC | Age: 63
End: 2023-01-20
Payer: COMMERCIAL

## 2023-01-20 VITALS
OXYGEN SATURATION: 97 % | HEIGHT: 66 IN | BODY MASS INDEX: 23.7 KG/M2 | HEART RATE: 76 BPM | WEIGHT: 147.5 LBS | DIASTOLIC BLOOD PRESSURE: 72 MMHG | SYSTOLIC BLOOD PRESSURE: 96 MMHG | RESPIRATION RATE: 14 BRPM | TEMPERATURE: 97.6 F

## 2023-01-20 DIAGNOSIS — M25.511 PAIN OF BOTH SHOULDER JOINTS: ICD-10-CM

## 2023-01-20 DIAGNOSIS — M25.512 LEFT SHOULDER PAIN, UNSPECIFIED CHRONICITY: ICD-10-CM

## 2023-01-20 DIAGNOSIS — L40.9 PSORIASIS: ICD-10-CM

## 2023-01-20 DIAGNOSIS — M25.512 PAIN OF BOTH SHOULDER JOINTS: ICD-10-CM

## 2023-01-20 DIAGNOSIS — L84 CALLUS OF FOOT: ICD-10-CM

## 2023-01-20 DIAGNOSIS — E03.9 ACQUIRED HYPOTHYROIDISM: Primary | ICD-10-CM

## 2023-01-20 DIAGNOSIS — E03.9 ACQUIRED HYPOTHYROIDISM: ICD-10-CM

## 2023-01-20 LAB
C-REACTIVE PROTEIN: <0.3 MG/DL (ref 0–0.4)
T4 FREE: 2.15 NG/DL (ref 0.93–1.7)
TSH SERPL DL<=0.05 MIU/L-ACNC: 0.62 UIU/ML (ref 0.27–4.2)

## 2023-01-20 PROCEDURE — 1036F TOBACCO NON-USER: CPT | Performed by: FAMILY MEDICINE

## 2023-01-20 PROCEDURE — 99214 OFFICE O/P EST MOD 30 MIN: CPT | Performed by: FAMILY MEDICINE

## 2023-01-20 PROCEDURE — G8427 DOCREV CUR MEDS BY ELIG CLIN: HCPCS | Performed by: FAMILY MEDICINE

## 2023-01-20 PROCEDURE — G8420 CALC BMI NORM PARAMETERS: HCPCS | Performed by: FAMILY MEDICINE

## 2023-01-20 PROCEDURE — G8484 FLU IMMUNIZE NO ADMIN: HCPCS | Performed by: FAMILY MEDICINE

## 2023-01-20 PROCEDURE — 3017F COLORECTAL CA SCREEN DOC REV: CPT | Performed by: FAMILY MEDICINE

## 2023-01-20 RX ORDER — TRIAMCINOLONE ACETONIDE 0.25 MG/G
CREAM TOPICAL EVERY 4 HOURS PRN
COMMUNITY

## 2023-01-20 RX ORDER — MELOXICAM 7.5 MG/1
7.5 TABLET ORAL DAILY
Qty: 90 TABLET | Refills: 1 | Status: SHIPPED | OUTPATIENT
Start: 2023-01-20

## 2023-01-20 RX ORDER — GLUCOSAMINE/D3/BOSWELLIA SERRA 1500MG-400
TABLET ORAL
COMMUNITY

## 2023-01-20 SDOH — ECONOMIC STABILITY: FOOD INSECURITY: WITHIN THE PAST 12 MONTHS, THE FOOD YOU BOUGHT JUST DIDN'T LAST AND YOU DIDN'T HAVE MONEY TO GET MORE.: NEVER TRUE

## 2023-01-20 SDOH — ECONOMIC STABILITY: FOOD INSECURITY: WITHIN THE PAST 12 MONTHS, YOU WORRIED THAT YOUR FOOD WOULD RUN OUT BEFORE YOU GOT MONEY TO BUY MORE.: NEVER TRUE

## 2023-01-20 ASSESSMENT — ENCOUNTER SYMPTOMS
HAIR LOSS: 0
CONSTIPATION: 0

## 2023-01-20 ASSESSMENT — SOCIAL DETERMINANTS OF HEALTH (SDOH): HOW HARD IS IT FOR YOU TO PAY FOR THE VERY BASICS LIKE FOOD, HOUSING, MEDICAL CARE, AND HEATING?: NOT HARD AT ALL

## 2023-01-20 ASSESSMENT — PATIENT HEALTH QUESTIONNAIRE - PHQ9
1. LITTLE INTEREST OR PLEASURE IN DOING THINGS: 0
SUM OF ALL RESPONSES TO PHQ QUESTIONS 1-9: 0
SUM OF ALL RESPONSES TO PHQ QUESTIONS 1-9: 0
SUM OF ALL RESPONSES TO PHQ9 QUESTIONS 1 & 2: 0
SUM OF ALL RESPONSES TO PHQ QUESTIONS 1-9: 0
2. FEELING DOWN, DEPRESSED OR HOPELESS: 0
SUM OF ALL RESPONSES TO PHQ QUESTIONS 1-9: 0

## 2023-01-20 NOTE — PROGRESS NOTES
Segundo Nelson, a female of 63 y.o. came to the office 1/20/2023. Patient Active Problem List   Diagnosis    Mild persistent asthma without complication    Hypothyroidism    JACEY (generalized anxiety disorder)    Vitamin D deficiency    Psoriasis          Other  Associated symptoms include arthralgias and fatigue. Joint Pain   The pain is present in the right shoulder and left shoulder. This is a recurrent problem. The quality of the pain is described as aching. The symptoms are aggravated by activity. She has tried nothing for the symptoms. Thyroid Problem  Presents for follow-up visit. Symptoms include fatigue. Patient reports no anxiety, cold intolerance, constipation, depressed mood, dry skin, hair loss, heat intolerance, palpitations, tremors, weight gain or weight loss. The symptoms have been stable. ? If she has psoriasis    Skin: wart on bottom of left foot bothering her. Allergies   Allergen Reactions    Sulfa Antibiotics Other (See Comments)     Blisters on hands, voiced patient    Pcn [Penicillins] Rash       Current Outpatient Medications on File Prior to Visit   Medication Sig Dispense Refill    triamcinolone (KENALOG) 0.025 % cream Apply topically every 4 hours as needed Apply Topically      Biotin 84296 MCG TABS Take by mouth      levothyroxine (SYNTHROID) 125 MCG tablet Take 1 tablet by mouth daily 90 tablet 1    sertraline (ZOLOFT) 50 MG tablet Take 1 tablet by mouth daily 90 tablet 1    albuterol sulfate HFA (PROVENTIL;VENTOLIN;PROAIR) 108 (90 Base) MCG/ACT inhaler USE 2 INHALATIONS ORALLY   EVERY 6 HOURS AS NEEDED FORWHEEZING 25.5 g 1    valACYclovir (VALTREX) 1 g tablet TAKE 2 TABLETS TWICE A DAY FOR 1 DAY 20 tablet 0    fluticasone-salmeterol (ADVAIR DISKUS) 100-50 MCG/DOSE diskus inhaler USE 1 INHALATION TWICE A  each 1    Nebulizers (COMPRESSOR/NEBULIZER) MISC Use as needed.  1 each 0    albuterol (PROVENTIL) (2.5 MG/3ML) 0.083% nebulizer solution Take 3 mLs by nebulization every 6 hours as needed for Wheezing or Shortness of Breath 360 mL 0    vitamin D (CHOLECALCIFEROL) 1000 UNIT TABS tablet Take 1,000 Units by mouth daily      Multiple Vitamin (THERA) TABS Take  by mouth. Calcium Carbonate-Vit D-Min (CALCIUM 1200 PO) Take  by mouth. No current facility-administered medications on file prior to visit. Review of Systems   Constitutional:  Positive for fatigue. Negative for weight gain and weight loss. Cardiovascular:  Negative for palpitations. Gastrointestinal:  Negative for constipation. Endocrine: Negative for cold intolerance and heat intolerance. Musculoskeletal:  Positive for arthralgias. Neurological:  Negative for tremors. Psychiatric/Behavioral:  The patient is not nervous/anxious. other review of systems reviewed and are negative    OBJECTIVE:  BP 96/72   Pulse 76   Temp 97.6 °F (36.4 °C)   Resp 14   Ht 5' 6\" (1.676 m)   Wt 147 lb 8 oz (66.9 kg)   SpO2 97%   BMI 23.81 kg/m²      Physical Exam  Vitals reviewed. Eyes:      General: No scleral icterus. Conjunctiva/sclera: Conjunctivae normal.   Neck:      Thyroid: No thyromegaly. Vascular: No carotid bruit. Cardiovascular:      Rate and Rhythm: Normal rate and regular rhythm. Heart sounds: No murmur heard. Pulmonary:      Effort: Pulmonary effort is normal.      Breath sounds: Normal breath sounds. No wheezing or rales. Abdominal:      General: Bowel sounds are normal.      Palpations: Abdomen is soft. There is no mass. Tenderness: There is no abdominal tenderness. There is no guarding or rebound. Musculoskeletal:         General: Normal range of motion. Cervical back: Neck supple. Feet:    Feet:      Comments: 1 cm thickened skin area with 1 mm central core. Lymphadenopathy:      Cervical: No cervical adenopathy. Skin:     General: Skin is warm and dry. Neurological:      Mental Status: She is alert and oriented to person, place, and time. Psychiatric:         Mood and Affect: Mood normal.       ASSESSMENT AND PLAN:    Orin Singer was seen today for other, hypothyroidism and joint pain. Diagnoses and all orders for this visit:    Acquired hypothyroidism  -     TSH; Future  -     T4, Free; Future    Pain of both shoulder joints  -     Sedimentation Rate; Future  -     C-Reactive Protein; Future    Callus of foot  -     [facility] Michaela Coello DPM, Podiatry, [facility], [place]    Psoriasis    Left shoulder pain, unspecified chronicity  -     meloxicam (MOBIC) 7.5 MG tablet; Take 1 tablet by mouth daily TAKE 1 TABLET DAILY  - ok to take Mobic prn or daily for what maybe psoriatic arthritis       Return if symptoms worsen or fail to improve, for PE , or for acute problem.     Witner Irvin, DO

## 2023-01-21 LAB — SEDIMENTATION RATE, ERYTHROCYTE: 3 MM/HR (ref 0–20)

## 2023-01-24 DIAGNOSIS — F41.1 GAD (GENERALIZED ANXIETY DISORDER): ICD-10-CM

## 2023-01-24 RX ORDER — LEVOTHYROXINE SODIUM 0.12 MG/1
125 TABLET ORAL DAILY
Qty: 90 TABLET | Refills: 1 | Status: SHIPPED | OUTPATIENT
Start: 2023-01-24

## 2023-02-15 ENCOUNTER — OFFICE VISIT (OUTPATIENT)
Dept: PODIATRY | Age: 63
End: 2023-02-15
Payer: COMMERCIAL

## 2023-02-15 VITALS
TEMPERATURE: 97.4 F | WEIGHT: 147 LBS | DIASTOLIC BLOOD PRESSURE: 70 MMHG | BODY MASS INDEX: 23.73 KG/M2 | SYSTOLIC BLOOD PRESSURE: 110 MMHG

## 2023-02-15 DIAGNOSIS — B07.0 PLANTAR WART: Primary | ICD-10-CM

## 2023-02-15 DIAGNOSIS — M79.675 PAIN IN LEFT TOE(S): ICD-10-CM

## 2023-02-15 PROCEDURE — 99202 OFFICE O/P NEW SF 15 MIN: CPT | Performed by: PODIATRIST

## 2023-02-15 PROCEDURE — G8420 CALC BMI NORM PARAMETERS: HCPCS | Performed by: PODIATRIST

## 2023-02-15 PROCEDURE — 3017F COLORECTAL CA SCREEN DOC REV: CPT | Performed by: PODIATRIST

## 2023-02-15 PROCEDURE — 1036F TOBACCO NON-USER: CPT | Performed by: PODIATRIST

## 2023-02-15 PROCEDURE — G8484 FLU IMMUNIZE NO ADMIN: HCPCS | Performed by: PODIATRIST

## 2023-02-15 PROCEDURE — G8427 DOCREV CUR MEDS BY ELIG CLIN: HCPCS | Performed by: PODIATRIST

## 2023-02-20 RX ORDER — LEVOTHYROXINE SODIUM 0.12 MG/1
125 TABLET ORAL DAILY
Qty: 90 TABLET | Refills: 1 | Status: SHIPPED | OUTPATIENT
Start: 2023-02-20

## 2023-02-27 RX ORDER — VALACYCLOVIR HYDROCHLORIDE 1 G/1
2000 TABLET, FILM COATED ORAL 2 TIMES DAILY
Qty: 20 TABLET | Refills: 0 | Status: SHIPPED | OUTPATIENT
Start: 2023-02-27 | End: 2023-02-28

## 2023-02-28 NOTE — PROGRESS NOTES
23  Tobi Mcadams : 1960 Sex: female  Age: 63 y.o. Patient was referred by Benja Nayak DO    Chief Complaint   Patient presents with    New Patient    Referral - General    Foot Pain     Lesion left foot referred by Dr. Reginald Aceves wife        SUBJECTIVE this new patient is seen today for a lesion on the left foot no trauma noted. HPI  Review of Systems  Const: Denies constitutional symptoms  Musculo: Denies symptoms other than stated above  Skin: Denies symptoms other than stated above       Current Outpatient Medications:     sertraline (ZOLOFT) 50 MG tablet, Take 1 tablet by mouth daily, Disp: 90 tablet, Rfl: 1    triamcinolone (KENALOG) 0.025 % cream, Apply topically every 4 hours as needed Apply Topically, Disp: , Rfl:     Biotin 68665 MCG TABS, Take by mouth, Disp: , Rfl:     albuterol sulfate HFA (PROVENTIL;VENTOLIN;PROAIR) 108 (90 Base) MCG/ACT inhaler, USE 2 INHALATIONS ORALLY   EVERY 6 HOURS AS NEEDED FORWHEEZING, Disp: 25.5 g, Rfl: 1    fluticasone-salmeterol (ADVAIR DISKUS) 100-50 MCG/DOSE diskus inhaler, USE 1 INHALATION TWICE A DAY, Disp: 180 each, Rfl: 1    Nebulizers (COMPRESSOR/NEBULIZER) MISC, Use as needed. , Disp: 1 each, Rfl: 0    albuterol (PROVENTIL) (2.5 MG/3ML) 0.083% nebulizer solution, Take 3 mLs by nebulization every 6 hours as needed for Wheezing or Shortness of Breath, Disp: 360 mL, Rfl: 0    vitamin D (CHOLECALCIFEROL) 1000 UNIT TABS tablet, Take 1,000 Units by mouth daily, Disp: , Rfl:     Multiple Vitamin (THERA) TABS, Take  by mouth.  , Disp: , Rfl:     Calcium Carbonate-Vit D-Min (CALCIUM 1200 PO), Take  by mouth.  , Disp: , Rfl:     valACYclovir (VALTREX) 1 g tablet, Take 2 tablets by mouth 2 times daily for 1 day, Disp: 20 tablet, Rfl: 0    levothyroxine (SYNTHROID) 125 MCG tablet, Take 1 tablet by mouth daily, Disp: 90 tablet, Rfl: 1    meloxicam (MOBIC) 7.5 MG tablet, Take 1 tablet by mouth daily TAKE 1 TABLET DAILY (Patient not taking: Reported on 2/15/2023), Disp: 90 tablet, Rfl: 1  Allergies   Allergen Reactions    Sulfa Antibiotics Other (See Comments)     Blisters on hands, voiced patient    Pcn [Penicillins] Rash       Past Medical History:   Diagnosis Date    Asthma     Osteopenia of lumbar spine 12/02/2021    Psoriasis     Thyroid disease      Past Surgical History:   Procedure Laterality Date    SINUS SURGERY       Family History   Problem Relation Age of Onset    Kidney Disease Mother 80        alz disease    Cancer Father 47        stomach     Social History     Socioeconomic History    Marital status:      Spouse name: Not on file    Number of children: 2    Years of education: Not on file    Highest education level: Not on file   Occupational History     Employer: [employer]   Tobacco Use    Smoking status: Never    Smokeless tobacco: Never   Vaping Use    Vaping Use: Never used   Substance and Sexual Activity    Alcohol use: Yes     Comment: occassional    Drug use: No    Sexual activity: Yes     Partners: Male   Other Topics Concern    Not on file   Social History Narrative    Not on file     Social Determinants of Health     Financial Resource Strain: Low Risk     Difficulty of Paying Living Expenses: Not hard at all   Food Insecurity: No Food Insecurity    Worried About Running Out of Food in the Last Year: Never true    Ran Out of Food in the Last Year: Never true   Transportation Needs: Not on file   Physical Activity: Not on file   Stress: Not on file   Social Connections: Not on file   Intimate Partner Violence: Not on file   Housing Stability: Not on file       Vitals:    02/15/23 0833   BP: 110/70   Temp: 97.4 °F (36.3 °C)   TempSrc: Temporal   Weight: 147 lb (66.7 kg)       Focused Lower Extremity Physical Exam:  Vitals:    02/15/23 0833   BP: 110/70   Temp: 97.4 °F (36.3 °C)        Foot Exam     Ortho Exam    Vascular: pulses  dp  pt    Capillary Refill Time:   Hair growth  Skin:    Edema:    Neurologic: Musculoskeletal/ Orthopedic examination:    NAIL  Web space   Derm: Plantar aspect of the left foot there is a 1 cm circular lesion        Assessment and Plan: Today the lesion was debrided frozen patient will continue to pad reappoint in 3 weeks  Carmen Lorenzana was seen today for new patient, referral - general and foot pain. Diagnoses and all orders for this visit:    Plantar wart    Pain in left toe(s)      No follow-ups on file. Seen By:  Kelly Cheng DPM      Document was created using voice recognition software. Note was reviewed, however may contain grammatical errors.

## 2023-03-27 ENCOUNTER — OFFICE VISIT (OUTPATIENT)
Dept: PODIATRY | Age: 63
End: 2023-03-27
Payer: COMMERCIAL

## 2023-03-27 VITALS
SYSTOLIC BLOOD PRESSURE: 122 MMHG | WEIGHT: 147 LBS | DIASTOLIC BLOOD PRESSURE: 78 MMHG | TEMPERATURE: 97.8 F | BODY MASS INDEX: 23.73 KG/M2

## 2023-03-27 DIAGNOSIS — B07.0 PLANTAR WART: Primary | ICD-10-CM

## 2023-03-27 DIAGNOSIS — M79.675 PAIN IN LEFT TOE(S): ICD-10-CM

## 2023-03-27 PROCEDURE — G8427 DOCREV CUR MEDS BY ELIG CLIN: HCPCS | Performed by: PODIATRIST

## 2023-03-27 PROCEDURE — 17110 DESTRUCTION B9 LES UP TO 14: CPT | Performed by: PODIATRIST

## 2023-03-27 PROCEDURE — G8420 CALC BMI NORM PARAMETERS: HCPCS | Performed by: PODIATRIST

## 2023-03-27 PROCEDURE — G8484 FLU IMMUNIZE NO ADMIN: HCPCS | Performed by: PODIATRIST

## 2023-03-27 PROCEDURE — 1036F TOBACCO NON-USER: CPT | Performed by: PODIATRIST

## 2023-03-27 PROCEDURE — 99213 OFFICE O/P EST LOW 20 MIN: CPT | Performed by: PODIATRIST

## 2023-03-27 PROCEDURE — 3017F COLORECTAL CA SCREEN DOC REV: CPT | Performed by: PODIATRIST

## 2023-03-27 NOTE — PROGRESS NOTES
[Penicillins] Rash       Past Medical History:   Diagnosis Date    Asthma     Osteopenia of lumbar spine 12/02/2021    Psoriasis     Thyroid disease      Past Surgical History:   Procedure Laterality Date    SINUS SURGERY       Family History   Problem Relation Age of Onset    Kidney Disease Mother 80        alz disease    Cancer Father 47        stomach     Social History     Socioeconomic History    Marital status:      Spouse name: Not on file    Number of children: 2    Years of education: Not on file    Highest education level: Not on file   Occupational History     Employer: [employer]   Tobacco Use    Smoking status: Never    Smokeless tobacco: Never   Vaping Use    Vaping Use: Never used   Substance and Sexual Activity    Alcohol use: Yes     Comment: occassional    Drug use: No    Sexual activity: Yes     Partners: Male   Other Topics Concern    Not on file   Social History Narrative    Not on file     Social Determinants of Health     Financial Resource Strain: Low Risk     Difficulty of Paying Living Expenses: Not hard at all   Food Insecurity: No Food Insecurity    Worried About Running Out of Food in the Last Year: Never true    Ran Out of Food in the Last Year: Never true   Transportation Needs: Not on file   Physical Activity: Not on file   Stress: Not on file   Social Connections: Not on file   Intimate Partner Violence: Not on file   Housing Stability: Not on file       Vitals:    03/27/23 0754   BP: 122/78   Temp: 97.8 °F (36.6 °C)   TempSrc: Temporal   Weight: 147 lb (66.7 kg)       Focused Lower Extremity Physical Exam:  Vitals:    03/27/23 0754   BP: 122/78   Temp: 97.8 °F (36.6 °C)        Foot Exam     Ortho Exam    Vascular: pulses  dp  pt    Capillary Refill Time:   Hair growth  Skin:    Edema:    Neurologic:      Musculoskeletal/ Orthopedic examination:    NAIL  Web space   Derm: Plantar aspect of the left foot there is a 1 cm circular lesion        Assessment and Plan:

## 2023-04-26 DIAGNOSIS — F41.1 GAD (GENERALIZED ANXIETY DISORDER): ICD-10-CM

## 2023-05-03 ENCOUNTER — PATIENT MESSAGE (OUTPATIENT)
Dept: PRIMARY CARE CLINIC | Age: 63
End: 2023-05-03

## 2023-05-03 ENCOUNTER — OFFICE VISIT (OUTPATIENT)
Dept: PODIATRY | Age: 63
End: 2023-05-03
Payer: COMMERCIAL

## 2023-05-03 VITALS
WEIGHT: 147 LBS | SYSTOLIC BLOOD PRESSURE: 122 MMHG | BODY MASS INDEX: 23.73 KG/M2 | TEMPERATURE: 98.2 F | DIASTOLIC BLOOD PRESSURE: 72 MMHG

## 2023-05-03 DIAGNOSIS — R05.9 COUGH: ICD-10-CM

## 2023-05-03 DIAGNOSIS — B07.0 PLANTAR WART: Primary | ICD-10-CM

## 2023-05-03 PROCEDURE — 99212 OFFICE O/P EST SF 10 MIN: CPT | Performed by: PODIATRIST

## 2023-05-03 PROCEDURE — G8420 CALC BMI NORM PARAMETERS: HCPCS | Performed by: PODIATRIST

## 2023-05-03 PROCEDURE — G8427 DOCREV CUR MEDS BY ELIG CLIN: HCPCS | Performed by: PODIATRIST

## 2023-05-03 PROCEDURE — 1036F TOBACCO NON-USER: CPT | Performed by: PODIATRIST

## 2023-05-03 PROCEDURE — 3017F COLORECTAL CA SCREEN DOC REV: CPT | Performed by: PODIATRIST

## 2023-05-03 RX ORDER — PROMETHAZINE HYDROCHLORIDE AND CODEINE PHOSPHATE 6.25; 1 MG/5ML; MG/5ML
5 SYRUP ORAL 4 TIMES DAILY PRN
Qty: 180 ML | Refills: 0 | Status: SHIPPED | OUTPATIENT
Start: 2023-05-03 | End: 2023-05-12

## 2023-05-03 NOTE — PROGRESS NOTES
History:   Diagnosis Date    Asthma     Osteopenia of lumbar spine 12/02/2021    Psoriasis     Thyroid disease      Past Surgical History:   Procedure Laterality Date    SINUS SURGERY       Family History   Problem Relation Age of Onset    Kidney Disease Mother 80        alz disease    Cancer Father 47        stomach     Social History     Socioeconomic History    Marital status:      Spouse name: Not on file    Number of children: 2    Years of education: Not on file    Highest education level: Not on file   Occupational History     Employer: [employer]   Tobacco Use    Smoking status: Never    Smokeless tobacco: Never   Vaping Use    Vaping Use: Never used   Substance and Sexual Activity    Alcohol use: Yes     Comment: occassional    Drug use: No    Sexual activity: Yes     Partners: Male   Other Topics Concern    Not on file   Social History Narrative    Not on file     Social Determinants of Health     Financial Resource Strain: Low Risk     Difficulty of Paying Living Expenses: Not hard at all   Food Insecurity: No Food Insecurity    Worried About Running Out of Food in the Last Year: Never true    Ran Out of Food in the Last Year: Never true   Transportation Needs: Not on file   Physical Activity: Not on file   Stress: Not on file   Social Connections: Not on file   Intimate Partner Violence: Not on file   Housing Stability: Not on file       Vitals:    05/03/23 0742   BP: 122/72   Temp: 98.2 °F (36.8 °C)   TempSrc: Temporal   Weight: 147 lb (66.7 kg)       Focused Lower Extremity Physical Exam:  Vitals:    05/03/23 0742   BP: 122/72   Temp: 98.2 °F (36.8 °C)        Foot Exam    General  General Appearance: appears stated age and healthy   Orientation: alert and oriented to person, place, and time       Right Foot/Ankle     Inspection and Palpation  Tenderness: none   Swelling: none   Skin Exam: skin intact;             Ortho Exam    Vascular: pulses  dp  pt    Capillary Refill Time:   Hair

## 2023-05-03 NOTE — TELEPHONE ENCOUNTER
From: Bryant Vega  To: Dr. Esparza Late: 5/3/2023 10:06 AM EDT  Subject: cough medicine     Hi Dr. Veena Fritz  Could I please have a refill for Promethazine Codeine cough syrup? Tis the season for weather changes and night time coughing, ugh.    If so, vero Montoya Dus [phone]  Thank you  Stephen Andino

## 2023-05-04 RX ORDER — BROMPHENIRAMINE MALEATE, PSEUDOEPHEDRINE HYDROCHLORIDE, AND DEXTROMETHORPHAN HYDROBROMIDE 2; 30; 10 MG/5ML; MG/5ML; MG/5ML
5 SYRUP ORAL 4 TIMES DAILY PRN
Qty: 200 ML | Refills: 0 | Status: SHIPPED | OUTPATIENT
Start: 2023-05-04

## 2023-05-08 RX ORDER — VALACYCLOVIR HYDROCHLORIDE 1 G/1
TABLET, FILM COATED ORAL
Qty: 20 TABLET | Refills: 1 | Status: SHIPPED | OUTPATIENT
Start: 2023-05-08

## 2023-05-20 ENCOUNTER — OFFICE VISIT (OUTPATIENT)
Dept: FAMILY MEDICINE CLINIC | Age: 63
End: 2023-05-20

## 2023-05-20 VITALS
BODY MASS INDEX: 23.14 KG/M2 | TEMPERATURE: 97.7 F | DIASTOLIC BLOOD PRESSURE: 80 MMHG | SYSTOLIC BLOOD PRESSURE: 120 MMHG | HEIGHT: 66 IN | HEART RATE: 94 BPM | OXYGEN SATURATION: 98 % | WEIGHT: 144 LBS

## 2023-05-20 DIAGNOSIS — J45.30 MILD PERSISTENT ASTHMA WITHOUT COMPLICATION: ICD-10-CM

## 2023-05-20 DIAGNOSIS — J45.901 ASTHMA WITH ACUTE EXACERBATION, UNSPECIFIED ASTHMA SEVERITY, UNSPECIFIED WHETHER PERSISTENT: Primary | ICD-10-CM

## 2023-05-20 DIAGNOSIS — R06.02 SOB (SHORTNESS OF BREATH): ICD-10-CM

## 2023-05-20 DIAGNOSIS — R05.1 ACUTE COUGH: ICD-10-CM

## 2023-05-20 DIAGNOSIS — J40 BRONCHITIS: ICD-10-CM

## 2023-05-20 RX ORDER — ALBUTEROL SULFATE 2.5 MG/3ML
2.5 SOLUTION RESPIRATORY (INHALATION) EVERY 6 HOURS PRN
Qty: 60 EACH | Refills: 0 | Status: SHIPPED | OUTPATIENT
Start: 2023-05-20

## 2023-05-20 RX ORDER — AZITHROMYCIN 250 MG/1
250 TABLET, FILM COATED ORAL SEE ADMIN INSTRUCTIONS
Qty: 6 TABLET | Refills: 0 | Status: SHIPPED | OUTPATIENT
Start: 2023-05-20 | End: 2023-05-25

## 2023-05-20 RX ORDER — TRIAMCINOLONE ACETONIDE 40 MG/ML
40 INJECTION, SUSPENSION INTRA-ARTICULAR; INTRAMUSCULAR ONCE
Status: COMPLETED | OUTPATIENT
Start: 2023-05-20 | End: 2023-05-20

## 2023-05-20 RX ORDER — AZITHROMYCIN 250 MG/1
250 TABLET, FILM COATED ORAL SEE ADMIN INSTRUCTIONS
Qty: 6 TABLET | Refills: 0 | Status: SHIPPED
Start: 2023-05-20 | End: 2023-05-20 | Stop reason: CLARIF

## 2023-05-20 RX ORDER — ALBUTEROL SULFATE 2.5 MG/3ML
2.5 SOLUTION RESPIRATORY (INHALATION) ONCE
Status: COMPLETED | OUTPATIENT
Start: 2023-05-20 | End: 2023-05-20

## 2023-05-20 RX ORDER — PREDNISONE 10 MG/1
TABLET ORAL
Qty: 12 TABLET | Refills: 0 | Status: SHIPPED | OUTPATIENT
Start: 2023-05-20 | End: 2023-05-26

## 2023-05-20 RX ORDER — BENZONATATE 100 MG/1
100 CAPSULE ORAL 3 TIMES DAILY PRN
Qty: 30 CAPSULE | Refills: 0 | Status: SHIPPED | OUTPATIENT
Start: 2023-05-20 | End: 2023-05-27

## 2023-05-20 RX ADMIN — TRIAMCINOLONE ACETONIDE 40 MG: 40 INJECTION, SUSPENSION INTRA-ARTICULAR; INTRAMUSCULAR at 08:33

## 2023-05-20 RX ADMIN — ALBUTEROL SULFATE 2.5 MG: 2.5 SOLUTION RESPIRATORY (INHALATION) at 08:41

## 2023-05-20 ASSESSMENT — ENCOUNTER SYMPTOMS
SINUS PRESSURE: 0
CHEST TIGHTNESS: 0
SINUS PAIN: 0
WHEEZING: 1
SORE THROAT: 0
ABDOMINAL PAIN: 0
COUGH: 1
EYES NEGATIVE: 1
SHORTNESS OF BREATH: 1
NAUSEA: 0

## 2023-05-20 NOTE — PROGRESS NOTES
mouth daily for 2 days, THEN 2 tablets daily for 2 days, THEN 1 tablet daily for 2 days. , Disp: 12 tablet, Rfl: 0    azithromycin (ZITHROMAX) 250 MG tablet, Take 1 tablet by mouth See Admin Instructions for 5 days 500mg on day 1 followed by 250mg on days 2 - 5, Disp: 6 tablet, Rfl: 0    brompheniramine-pseudoephedrine-DM 2-30-10 MG/5ML syrup, Take 5 mLs by mouth 4 times daily as needed for Congestion or Cough, Disp: 200 mL, Rfl: 0    sertraline (ZOLOFT) 50 MG tablet, TAKE 1 TABLET BY MOUTH DAILY, Disp: 90 tablet, Rfl: 0    levothyroxine (SYNTHROID) 125 MCG tablet, Take 1 tablet by mouth daily, Disp: 90 tablet, Rfl: 1    albuterol sulfate HFA (PROVENTIL;VENTOLIN;PROAIR) 108 (90 Base) MCG/ACT inhaler, USE 2 INHALATIONS ORALLY   EVERY 6 HOURS AS NEEDED FORWHEEZING, Disp: 25.5 g, Rfl: 1    fluticasone-salmeterol (ADVAIR DISKUS) 100-50 MCG/DOSE diskus inhaler, USE 1 INHALATION TWICE A DAY, Disp: 180 each, Rfl: 1    albuterol (PROVENTIL) (2.5 MG/3ML) 0.083% nebulizer solution, Take 3 mLs by nebulization every 6 hours as needed for Wheezing or Shortness of Breath, Disp: 360 mL, Rfl: 0    vitamin D (CHOLECALCIFEROL) 1000 UNIT TABS tablet, Take 1 tablet by mouth daily, Disp: , Rfl:     Multiple Vitamin (THERA) TABS, Take  by mouth.  , Disp: , Rfl:     Calcium Carbonate-Vit D-Min (CALCIUM 1200 PO), Take  by mouth.  , Disp: , Rfl:     valACYclovir (VALTREX) 1 g tablet, TAKE 2 TABLETS BY MOUTH TWICE  DAILY FOR 1 DAY (Patient not taking: Reported on 5/20/2023), Disp: 20 tablet, Rfl: 1    triamcinolone (KENALOG) 0.025 % cream, Apply topically every 4 hours as needed Apply Topically (Patient not taking: Reported on 5/20/2023), Disp: , Rfl:     Biotin 16602 MCG TABS, Take by mouth (Patient not taking: Reported on 5/20/2023), Disp: , Rfl:     meloxicam (MOBIC) 7.5 MG tablet, Take 1 tablet by mouth daily TAKE 1 TABLET DAILY (Patient not taking: Reported on 5/20/2023), Disp: 90 tablet, Rfl: 1    Nebulizers (COMPRESSOR/NEBULIZER) MISC,

## 2023-05-22 RX ORDER — FLUTICASONE PROPIONATE AND SALMETEROL 100; 50 UG/1; UG/1
POWDER RESPIRATORY (INHALATION)
Qty: 60 EACH | Refills: 3 | Status: SHIPPED | OUTPATIENT
Start: 2023-05-22

## 2023-06-18 DIAGNOSIS — M25.512 LEFT SHOULDER PAIN, UNSPECIFIED CHRONICITY: ICD-10-CM

## 2023-06-19 RX ORDER — MELOXICAM 7.5 MG/1
TABLET ORAL
Qty: 90 TABLET | Refills: 1 | Status: SHIPPED | OUTPATIENT
Start: 2023-06-19

## 2023-06-30 DIAGNOSIS — E03.9 ACQUIRED HYPOTHYROIDISM: Primary | ICD-10-CM

## 2023-07-03 RX ORDER — LEVOTHYROXINE SODIUM 0.12 MG/1
125 TABLET ORAL DAILY
Qty: 90 TABLET | Refills: 0 | Status: SHIPPED | OUTPATIENT
Start: 2023-07-03

## 2023-07-10 ENCOUNTER — OFFICE VISIT (OUTPATIENT)
Dept: PRIMARY CARE CLINIC | Age: 63
End: 2023-07-10
Payer: COMMERCIAL

## 2023-07-10 VITALS
HEART RATE: 67 BPM | HEIGHT: 66 IN | BODY MASS INDEX: 23.3 KG/M2 | SYSTOLIC BLOOD PRESSURE: 118 MMHG | TEMPERATURE: 97.6 F | OXYGEN SATURATION: 97 % | DIASTOLIC BLOOD PRESSURE: 68 MMHG | WEIGHT: 145 LBS

## 2023-07-10 DIAGNOSIS — Z00.00 ANNUAL PHYSICAL EXAM: Primary | ICD-10-CM

## 2023-07-10 DIAGNOSIS — E03.9 ACQUIRED HYPOTHYROIDISM: ICD-10-CM

## 2023-07-10 DIAGNOSIS — F41.1 GAD (GENERALIZED ANXIETY DISORDER): ICD-10-CM

## 2023-07-10 PROCEDURE — 99396 PREV VISIT EST AGE 40-64: CPT | Performed by: FAMILY MEDICINE

## 2023-07-10 RX ORDER — LEVOTHYROXINE SODIUM 0.12 MG/1
125 TABLET ORAL DAILY
Qty: 90 TABLET | Refills: 1 | Status: SHIPPED | OUTPATIENT
Start: 2023-07-10

## 2023-07-10 SDOH — ECONOMIC STABILITY: FOOD INSECURITY: WITHIN THE PAST 12 MONTHS, YOU WORRIED THAT YOUR FOOD WOULD RUN OUT BEFORE YOU GOT MONEY TO BUY MORE.: NEVER TRUE

## 2023-07-10 SDOH — ECONOMIC STABILITY: FOOD INSECURITY: WITHIN THE PAST 12 MONTHS, THE FOOD YOU BOUGHT JUST DIDN'T LAST AND YOU DIDN'T HAVE MONEY TO GET MORE.: NEVER TRUE

## 2023-07-10 SDOH — ECONOMIC STABILITY: INCOME INSECURITY: HOW HARD IS IT FOR YOU TO PAY FOR THE VERY BASICS LIKE FOOD, HOUSING, MEDICAL CARE, AND HEATING?: NOT HARD AT ALL

## 2023-07-10 SDOH — ECONOMIC STABILITY: HOUSING INSECURITY
IN THE LAST 12 MONTHS, WAS THERE A TIME WHEN YOU DID NOT HAVE A STEADY PLACE TO SLEEP OR SLEPT IN A SHELTER (INCLUDING NOW)?: NO

## 2023-07-10 NOTE — PROGRESS NOTES
SUBJECTIVE:    HPI: Marianne Mcguire  Was seen here today for   Chief Complaint   Patient presents with    Annual Exam    .  She states they are dealing with son who has cirrhosis due to his alcohol issues. Past Medical History:   Diagnosis Date    Asthma     Osteopenia of lumbar spine 12/02/2021    Psoriasis     Thyroid disease        Past Surgical History:   Procedure Laterality Date    SINUS SURGERY         Family History   Problem Relation Age of Onset    Kidney Disease Mother 80        alz disease    Cancer Father 47        stomach       Prior to Admission medications    Medication Sig Start Date End Date Taking? Authorizing Provider   sertraline (ZOLOFT) 50 MG tablet Take 1 tablet by mouth daily 7/10/23  Yes Dru Irvin, DO   levothyroxine (SYNTHROID) 125 MCG tablet Take 1 tablet by mouth daily 7/10/23  Yes Dru Irvin, DO   meloxicam (MOBIC) 7.5 MG tablet TAKE 1 TABLET BY MOUTH DAILY 6/19/23   Dru Irvin, DO   fluticasone-salmeterol (ADVAIR) 100-50 MCG/ACT AEPB diskus inhaler USE 1 INHALATION ORALLY    TWICE DAILY 5/22/23   Dru Irvin DO   albuterol (PROVENTIL) (2.5 MG/3ML) 0.083% nebulizer solution Take 3 mLs by nebulization every 6 hours as needed for Wheezing 5/20/23   Merlyn Schwarz MD   valACYclovir (VALTREX) 1 g tablet TAKE 2 TABLETS BY MOUTH TWICE  DAILY FOR 1 DAY  Patient not taking: Reported on 5/20/2023 5/8/23   Shaina Irvin DO   albuterol sulfate HFA (PROVENTIL;VENTOLIN;PROAIR) 108 (90 Base) MCG/ACT inhaler USE 2 INHALATIONS ORALLY   EVERY 6 HOURS AS NEEDED FORWHEEZING 8/5/22   Dru Irvin DO   Nebulizers (COMPRESSOR/NEBULIZER) MISC Use as needed.  1/3/22   Dru Irvin DO   albuterol (PROVENTIL) (2.5 MG/3ML) 0.083% nebulizer solution Take 3 mLs by nebulization every 6 hours as needed for Wheezing or Shortness of Breath 2/11/21   Shaina Irvin, DO   vitamin D (CHOLECALCIFEROL) 1000 UNIT TABS tablet Take 1

## 2023-07-11 DIAGNOSIS — E03.9 ACQUIRED HYPOTHYROIDISM: ICD-10-CM

## 2023-07-11 DIAGNOSIS — Z00.00 ANNUAL PHYSICAL EXAM: ICD-10-CM

## 2023-07-11 LAB
ALBUMIN SERPL-MCNC: 4.5 G/DL (ref 3.5–5.2)
ALP SERPL-CCNC: 67 U/L (ref 35–104)
ALT SERPL-CCNC: 21 U/L (ref 0–32)
ANION GAP SERPL CALCULATED.3IONS-SCNC: 12 MMOL/L (ref 7–16)
AST SERPL-CCNC: 23 U/L (ref 0–31)
BILIRUB SERPL-MCNC: 0.9 MG/DL (ref 0–1.2)
BUN SERPL-MCNC: 11 MG/DL (ref 6–23)
CALCIUM SERPL-MCNC: 9.6 MG/DL (ref 8.6–10.2)
CHLORIDE SERPL-SCNC: 106 MMOL/L (ref 98–107)
CHOLESTEROL, TOTAL: 183 MG/DL (ref 0–199)
CO2 SERPL-SCNC: 23 MMOL/L (ref 22–29)
CREAT SERPL-MCNC: 0.6 MG/DL (ref 0.5–1)
ERYTHROCYTE [DISTWIDTH] IN BLOOD BY AUTOMATED COUNT: 13.3 FL (ref 11.5–15)
GLUCOSE SERPL-MCNC: 87 MG/DL (ref 74–99)
HCT VFR BLD AUTO: 44.2 % (ref 34–48)
HDLC SERPL-MCNC: 67 MG/DL
HGB BLD-MCNC: 13.3 G/DL (ref 11.5–15.5)
LDLC SERPL CALC-MCNC: 96 MG/DL (ref 0–99)
MCH RBC QN AUTO: 28.5 PG (ref 26–35)
MCHC RBC AUTO-ENTMCNC: 30.1 % (ref 32–34.5)
MCV RBC AUTO: 94.6 FL (ref 80–99.9)
PLATELET # BLD AUTO: 272 E9/L (ref 130–450)
PMV BLD AUTO: 11.5 FL (ref 7–12)
POTASSIUM SERPL-SCNC: 4.1 MMOL/L (ref 3.5–5)
PROT SERPL-MCNC: 6.4 G/DL (ref 6.4–8.3)
RBC # BLD AUTO: 4.67 E12/L (ref 3.5–5.5)
SODIUM SERPL-SCNC: 141 MMOL/L (ref 132–146)
TRIGL SERPL-MCNC: 102 MG/DL (ref 0–149)
TSH SERPL-MCNC: 1.2 UIU/ML (ref 0.27–4.2)
VLDLC SERPL CALC-MCNC: 20 MG/DL
WBC # BLD: 4.2 E9/L (ref 4.5–11.5)

## 2023-07-29 DIAGNOSIS — J45.30 MILD PERSISTENT ASTHMA WITHOUT COMPLICATION: ICD-10-CM

## 2023-07-31 RX ORDER — FLUTICASONE PROPIONATE AND SALMETEROL 100; 50 UG/1; UG/1
POWDER RESPIRATORY (INHALATION)
Qty: 180 EACH | Refills: 1 | Status: SHIPPED | OUTPATIENT
Start: 2023-07-31

## 2023-10-31 RX ORDER — VALACYCLOVIR HYDROCHLORIDE 1 G/1
TABLET, FILM COATED ORAL
Qty: 20 TABLET | Refills: 1 | Status: SHIPPED | OUTPATIENT
Start: 2023-10-31

## 2023-12-14 LAB — MAMMOGRAPHY, EXTERNAL: NORMAL

## 2024-01-17 ENCOUNTER — OFFICE VISIT (OUTPATIENT)
Dept: PRIMARY CARE CLINIC | Age: 64
End: 2024-01-17
Payer: COMMERCIAL

## 2024-01-17 VITALS
HEIGHT: 66 IN | HEART RATE: 70 BPM | BODY MASS INDEX: 24.27 KG/M2 | WEIGHT: 151 LBS | SYSTOLIC BLOOD PRESSURE: 118 MMHG | OXYGEN SATURATION: 98 % | DIASTOLIC BLOOD PRESSURE: 70 MMHG | TEMPERATURE: 97.3 F

## 2024-01-17 DIAGNOSIS — F41.1 GAD (GENERALIZED ANXIETY DISORDER): ICD-10-CM

## 2024-01-17 DIAGNOSIS — E03.9 ACQUIRED HYPOTHYROIDISM: Primary | ICD-10-CM

## 2024-01-17 PROCEDURE — G8427 DOCREV CUR MEDS BY ELIG CLIN: HCPCS | Performed by: FAMILY MEDICINE

## 2024-01-17 PROCEDURE — G8484 FLU IMMUNIZE NO ADMIN: HCPCS | Performed by: FAMILY MEDICINE

## 2024-01-17 PROCEDURE — 1036F TOBACCO NON-USER: CPT | Performed by: FAMILY MEDICINE

## 2024-01-17 PROCEDURE — 99213 OFFICE O/P EST LOW 20 MIN: CPT | Performed by: FAMILY MEDICINE

## 2024-01-17 PROCEDURE — G8420 CALC BMI NORM PARAMETERS: HCPCS | Performed by: FAMILY MEDICINE

## 2024-01-17 PROCEDURE — 3017F COLORECTAL CA SCREEN DOC REV: CPT | Performed by: FAMILY MEDICINE

## 2024-01-17 ASSESSMENT — ENCOUNTER SYMPTOMS
CONSTIPATION: 0
HAIR LOSS: 0
DIARRHEA: 0

## 2024-01-17 ASSESSMENT — PATIENT HEALTH QUESTIONNAIRE - PHQ9
1. LITTLE INTEREST OR PLEASURE IN DOING THINGS: 0
SUM OF ALL RESPONSES TO PHQ QUESTIONS 1-9: 0
2. FEELING DOWN, DEPRESSED OR HOPELESS: 0
SUM OF ALL RESPONSES TO PHQ QUESTIONS 1-9: 0
SUM OF ALL RESPONSES TO PHQ9 QUESTIONS 1 & 2: 0

## 2024-01-17 NOTE — PROGRESS NOTES
Meghan Le, a female of 63 y.o. came to the office 1/17/2024.     Patient Active Problem List   Diagnosis    Mild persistent asthma without complication    Hypothyroidism    JACEY (generalized anxiety disorder)    Vitamin D deficiency    Psoriasis          Thyroid Problem  Presents for follow-up visit. Symptoms include anxiety. Patient reports no cold intolerance, constipation, depressed mood, diarrhea, dry skin, fatigue, hair loss, heat intolerance, menstrual problem, nail problem, palpitations, weight gain or weight loss. The symptoms have been improving.   Anxiety  Presents for follow-up (increased Zoloft to 75 mg and she's sleeping and less anxious.) visit. Symptoms include insomnia and nervous/anxious behavior. Patient reports no chest pain, depressed mood, excessive worry, irritability, palpitations or panic.            Allergies   Allergen Reactions    Sulfa Antibiotics Other (See Comments)     Blisters on hands, voiced patient    Pcn [Penicillins] Rash       Current Outpatient Medications on File Prior to Visit   Medication Sig Dispense Refill    levothyroxine (SYNTHROID) 125 MCG tablet TAKE 1 TABLET BY MOUTH DAILY 90 tablet 3    valACYclovir (VALTREX) 1 g tablet TAKE 2 TABLETS BY MOUTH TWICE  DAILY FOR 1 DAY 20 tablet 1    fluticasone-salmeterol (ADVAIR DISKUS) 100-50 MCG/ACT AEPB diskus inhaler USE 1 INHALATION BY MOUTH TWICE  DAILY 180 each 1    meloxicam (MOBIC) 7.5 MG tablet TAKE 1 TABLET BY MOUTH DAILY 90 tablet 1    albuterol (PROVENTIL) (2.5 MG/3ML) 0.083% nebulizer solution Take 3 mLs by nebulization every 6 hours as needed for Wheezing 60 each 0    albuterol sulfate HFA (PROVENTIL;VENTOLIN;PROAIR) 108 (90 Base) MCG/ACT inhaler USE 2 INHALATIONS ORALLY   EVERY 6 HOURS AS NEEDED FORWHEEZING 25.5 g 1    Nebulizers (COMPRESSOR/NEBULIZER) MISC Use as needed. 1 each 0    albuterol (PROVENTIL) (2.5 MG/3ML) 0.083% nebulizer solution Take 3 mLs by nebulization every 6 hours as needed for Wheezing or

## 2024-06-13 RX ORDER — VALACYCLOVIR HYDROCHLORIDE 1 G/1
TABLET, FILM COATED ORAL
Qty: 20 TABLET | Refills: 1 | Status: SHIPPED | OUTPATIENT
Start: 2024-06-13

## 2024-06-21 DIAGNOSIS — F41.1 GAD (GENERALIZED ANXIETY DISORDER): ICD-10-CM

## 2024-07-31 ENCOUNTER — OFFICE VISIT (OUTPATIENT)
Dept: PRIMARY CARE CLINIC | Age: 64
End: 2024-07-31
Payer: COMMERCIAL

## 2024-07-31 VITALS
HEIGHT: 66 IN | DIASTOLIC BLOOD PRESSURE: 88 MMHG | TEMPERATURE: 97.4 F | OXYGEN SATURATION: 97 % | BODY MASS INDEX: 23.78 KG/M2 | RESPIRATION RATE: 16 BRPM | WEIGHT: 148 LBS | HEART RATE: 73 BPM | SYSTOLIC BLOOD PRESSURE: 118 MMHG

## 2024-07-31 DIAGNOSIS — F41.1 GAD (GENERALIZED ANXIETY DISORDER): ICD-10-CM

## 2024-07-31 DIAGNOSIS — J45.30 MILD PERSISTENT ASTHMA WITHOUT COMPLICATION: ICD-10-CM

## 2024-07-31 DIAGNOSIS — G25.81 RLS (RESTLESS LEGS SYNDROME): ICD-10-CM

## 2024-07-31 DIAGNOSIS — Z00.00 ANNUAL PHYSICAL EXAM: Primary | ICD-10-CM

## 2024-07-31 DIAGNOSIS — E03.9 ACQUIRED HYPOTHYROIDISM: ICD-10-CM

## 2024-07-31 DIAGNOSIS — M25.512 LEFT SHOULDER PAIN, UNSPECIFIED CHRONICITY: ICD-10-CM

## 2024-07-31 LAB
ALBUMIN: 4.7 G/DL (ref 3.5–5.2)
ALP BLD-CCNC: 84 U/L (ref 35–104)
ALT SERPL-CCNC: 18 U/L (ref 0–32)
ANION GAP SERPL CALCULATED.3IONS-SCNC: 13 MMOL/L (ref 7–16)
AST SERPL-CCNC: 22 U/L (ref 0–31)
BILIRUB SERPL-MCNC: 1.1 MG/DL (ref 0–1.2)
BUN BLDV-MCNC: 13 MG/DL (ref 6–23)
CALCIUM SERPL-MCNC: 9.8 MG/DL (ref 8.6–10.2)
CHLORIDE BLD-SCNC: 106 MMOL/L (ref 98–107)
CO2: 22 MMOL/L (ref 22–29)
CREAT SERPL-MCNC: 0.6 MG/DL (ref 0.5–1)
GFR, ESTIMATED: >90 ML/MIN/1.73M2
GLUCOSE BLD-MCNC: 98 MG/DL (ref 74–99)
HBA1C MFR BLD: 5.6 % (ref 4–5.6)
HCT VFR BLD CALC: 45.9 % (ref 34–48)
HEMOGLOBIN: 14.3 G/DL (ref 11.5–15.5)
MCH RBC QN AUTO: 28.2 PG (ref 26–35)
MCHC RBC AUTO-ENTMCNC: 31.2 G/DL (ref 32–34.5)
MCV RBC AUTO: 90.5 FL (ref 80–99.9)
PDW BLD-RTO: 13.3 % (ref 11.5–15)
PLATELET # BLD: 309 K/UL (ref 130–450)
PMV BLD AUTO: 11.5 FL (ref 7–12)
POTASSIUM SERPL-SCNC: 4.3 MMOL/L (ref 3.5–5)
RBC # BLD: 5.07 M/UL (ref 3.5–5.5)
SODIUM BLD-SCNC: 141 MMOL/L (ref 132–146)
TOTAL PROTEIN: 7.3 G/DL (ref 6.4–8.3)
TSH SERPL DL<=0.05 MIU/L-ACNC: 9.55 UIU/ML (ref 0.27–4.2)
WBC # BLD: 5.1 K/UL (ref 4.5–11.5)

## 2024-07-31 PROCEDURE — 99396 PREV VISIT EST AGE 40-64: CPT | Performed by: FAMILY MEDICINE

## 2024-07-31 RX ORDER — LEVOTHYROXINE SODIUM 0.12 MG/1
125 TABLET ORAL DAILY
Qty: 90 TABLET | Refills: 1 | Status: SHIPPED
Start: 2024-07-31 | End: 2024-08-02 | Stop reason: DRUGHIGH

## 2024-07-31 RX ORDER — ROPINIROLE 0.5 MG/1
0.5 TABLET, FILM COATED ORAL NIGHTLY
Qty: 30 TABLET | Refills: 2 | Status: SHIPPED | OUTPATIENT
Start: 2024-07-31

## 2024-07-31 RX ORDER — FLUTICASONE FUROATE AND VILANTEROL 200; 25 UG/1; UG/1
1 POWDER RESPIRATORY (INHALATION) DAILY
Qty: 90 EACH | Refills: 1 | Status: SHIPPED | OUTPATIENT
Start: 2024-07-31

## 2024-07-31 RX ORDER — TRIAMCINOLONE ACETONIDE 1 MG/G
OINTMENT TOPICAL
COMMUNITY
Start: 2024-06-13

## 2024-07-31 RX ORDER — ALBUTEROL SULFATE 90 UG/1
AEROSOL, METERED RESPIRATORY (INHALATION)
Qty: 25.5 G | Refills: 1 | Status: SHIPPED | OUTPATIENT
Start: 2024-07-31

## 2024-07-31 SDOH — ECONOMIC STABILITY: FOOD INSECURITY: WITHIN THE PAST 12 MONTHS, THE FOOD YOU BOUGHT JUST DIDN'T LAST AND YOU DIDN'T HAVE MONEY TO GET MORE.: NEVER TRUE

## 2024-07-31 SDOH — ECONOMIC STABILITY: FOOD INSECURITY: WITHIN THE PAST 12 MONTHS, YOU WORRIED THAT YOUR FOOD WOULD RUN OUT BEFORE YOU GOT MONEY TO BUY MORE.: NEVER TRUE

## 2024-07-31 SDOH — ECONOMIC STABILITY: INCOME INSECURITY: HOW HARD IS IT FOR YOU TO PAY FOR THE VERY BASICS LIKE FOOD, HOUSING, MEDICAL CARE, AND HEATING?: NOT HARD AT ALL

## 2024-07-31 NOTE — PROGRESS NOTES
Dru Irvin DO   albuterol (PROVENTIL) (2.5 MG/3ML) 0.083% nebulizer solution Take 3 mLs by nebulization every 6 hours as needed for Wheezing 5/20/23  Yes Cameron Fernandez MD   Nebulizers (COMPRESSOR/NEBULIZER) MISC Use as needed. 1/3/22  Yes rDu Irvin DO   vitamin D (CHOLECALCIFEROL) 1000 UNIT TABS tablet Take 1 tablet by mouth daily   Yes Noel Miller MD   Multiple Vitamin (THERA) TABS Take  by mouth.     Yes Noel Miller MD   Calcium Carbonate-Vit D-Min (CALCIUM 1200 PO) Take  by mouth.     Yes Noel Miller MD       Sulfa antibiotics and Pcn [penicillins]    Social History     Tobacco Use    Smoking status: Never     Passive exposure: Never    Smokeless tobacco: Never   Substance Use Topics    Alcohol use: Yes     Comment: occassional         Review Of Systems:    Skin: no abnormal pigmentation, rash, scaling, itching, masses, hair or nail changes  Eyes: no blurring, diplopia, or eye pain  Ears/Nose/Throat: no hearing loss, tinnitus, vertigo, nosebleed, nasal congestion, rhinorrhea, sore throat  Respiratory: no cough, pleuritic chest pain, dyspnea, or wheezing  Cardiovascular: no chest pain, angina, dyspnea on exertion, orthopnea, PND, palpitations, or claudication  Gastrointestinal: no nausea, vomiting, heartburn, diarrhea, constipation, bloating,  abdominal pain, or blood per rectum  Genitourinary: no urinary urgency, frequency, dysuria, nocturia, hesitancy, or incontinence  Musculoskeletal: no arthritis, arthralgia, myalgia, weakness, or morning stiffness  Neurologic: no paralysis, paresis, paresthesia, seizures, tremors, or headaches. Legs restless hs.  Hematologic/Lymphatic/Immunologic: no anemia, abnormal bleeding/bruising, fever, chills, night sweats, swollen glands, or unexplained weight loss  Endocrine: no heat or cold intolerance and no polyphagia, polydipsia, or polyuria  Psych: no anxiety or depression. Alternating Zoloft 50 mg and 75 mg qod. 
impaired balance/decreased flexibility/decreased ROM/decreased strength

## 2024-08-01 LAB
CHOLESTEROL, TOTAL: 222 MG/DL
HDLC SERPL-MCNC: 72 MG/DL
LDL CHOLESTEROL: 125 MG/DL
TRIGL SERPL-MCNC: 127 MG/DL
VLDLC SERPL CALC-MCNC: 25 MG/DL

## 2024-08-02 ENCOUNTER — TELEPHONE (OUTPATIENT)
Dept: PRIMARY CARE CLINIC | Age: 64
End: 2024-08-02

## 2024-08-02 DIAGNOSIS — E03.9 ACQUIRED HYPOTHYROIDISM: Primary | ICD-10-CM

## 2024-08-02 RX ORDER — LEVOTHYROXINE SODIUM 137 MCG
137 TABLET ORAL DAILY
Qty: 30 TABLET | Refills: 1
Start: 2024-08-02

## 2024-08-02 NOTE — TELEPHONE ENCOUNTER
Spoke with patient TSH elevated so we will increase her Synthroid from 125 mcg up to 137 mcg and make it dispense as written.  Recheck TSH and T4 in 6 to 8 weeks.  Lab appointment only needed.

## 2024-08-05 RX ORDER — LEVOTHYROXINE SODIUM 137 MCG
137 TABLET ORAL DAILY
Qty: 30 TABLET | Refills: 5 | Status: SHIPPED | OUTPATIENT
Start: 2024-08-05

## 2024-09-17 DIAGNOSIS — E03.9 ACQUIRED HYPOTHYROIDISM: ICD-10-CM

## 2024-09-17 LAB
T4 FREE: 1.7 NG/DL (ref 0.9–1.7)
TSH SERPL DL<=0.05 MIU/L-ACNC: 0.85 UIU/ML (ref 0.27–4.2)

## 2024-09-18 ENCOUNTER — PATIENT MESSAGE (OUTPATIENT)
Dept: PRIMARY CARE CLINIC | Age: 64
End: 2024-09-18

## 2024-09-19 RX ORDER — LEVOTHYROXINE SODIUM 137 MCG
137 TABLET ORAL DAILY
Qty: 90 TABLET | Refills: 1
Start: 2024-09-19

## 2024-09-25 DIAGNOSIS — J45.30 MILD PERSISTENT ASTHMA WITHOUT COMPLICATION: ICD-10-CM

## 2024-09-25 RX ORDER — ALBUTEROL SULFATE 90 UG/1
INHALANT RESPIRATORY (INHALATION)
Qty: 20.1 G | Refills: 3 | Status: SHIPPED | OUTPATIENT
Start: 2024-09-25

## 2024-10-06 DIAGNOSIS — J45.30 MILD PERSISTENT ASTHMA WITHOUT COMPLICATION: ICD-10-CM

## 2024-10-07 RX ORDER — FLUTICASONE FUROATE AND VILANTEROL TRIFENATATE 200; 25 UG/1; UG/1
POWDER RESPIRATORY (INHALATION)
Qty: 180 EACH | Refills: 1 | Status: SHIPPED | OUTPATIENT
Start: 2024-10-07

## 2024-10-28 DIAGNOSIS — G25.81 RLS (RESTLESS LEGS SYNDROME): ICD-10-CM

## 2024-10-28 RX ORDER — ROPINIROLE 0.5 MG/1
TABLET, FILM COATED ORAL
Qty: 30 TABLET | Refills: 2 | Status: SHIPPED | OUTPATIENT
Start: 2024-10-28

## 2024-11-27 ENCOUNTER — OFFICE VISIT (OUTPATIENT)
Dept: PRIMARY CARE CLINIC | Age: 64
End: 2024-11-27

## 2024-11-27 VITALS
HEART RATE: 61 BPM | TEMPERATURE: 97.3 F | SYSTOLIC BLOOD PRESSURE: 139 MMHG | OXYGEN SATURATION: 97 % | BODY MASS INDEX: 24.43 KG/M2 | DIASTOLIC BLOOD PRESSURE: 88 MMHG | WEIGHT: 152 LBS | HEIGHT: 66 IN

## 2024-11-27 DIAGNOSIS — G25.81 RLS (RESTLESS LEGS SYNDROME): ICD-10-CM

## 2024-11-27 DIAGNOSIS — E78.49 OTHER HYPERLIPIDEMIA: ICD-10-CM

## 2024-11-27 DIAGNOSIS — Z23 NEED FOR INFLUENZA VACCINATION: ICD-10-CM

## 2024-11-27 DIAGNOSIS — G89.29 CHRONIC RIGHT SHOULDER PAIN: ICD-10-CM

## 2024-11-27 DIAGNOSIS — E03.9 ACQUIRED HYPOTHYROIDISM: Primary | ICD-10-CM

## 2024-11-27 DIAGNOSIS — M25.511 CHRONIC RIGHT SHOULDER PAIN: ICD-10-CM

## 2024-11-27 LAB
HDLC SERPL-MCNC: 66 MG/DL
MAGNESIUM: 2.3 MG/DL (ref 1.6–2.6)
TRIGL SERPL-MCNC: 115 MG/DL
TSH SERPL DL<=0.05 MIU/L-ACNC: 1.99 UIU/ML (ref 0.27–4.2)

## 2024-12-03 ENCOUNTER — TELEPHONE (OUTPATIENT)
Dept: PRIMARY CARE CLINIC | Age: 64
End: 2024-12-03

## 2024-12-03 NOTE — TELEPHONE ENCOUNTER
Discussed right shoulder x-ray which shows osteopenia and some arthritic changes.  She will do her home exercises.  I discussed possible joint injection if pain does not resolve.  Also discussed possible orthopedic referral if need be.  She will follow-up as needed for this.

## 2025-01-20 DIAGNOSIS — M25.512 LEFT SHOULDER PAIN, UNSPECIFIED CHRONICITY: ICD-10-CM

## 2025-01-20 DIAGNOSIS — F41.1 GAD (GENERALIZED ANXIETY DISORDER): ICD-10-CM

## 2025-01-20 DIAGNOSIS — G25.81 RLS (RESTLESS LEGS SYNDROME): ICD-10-CM

## 2025-01-20 RX ORDER — LEVOTHYROXINE SODIUM 137 MCG
137 TABLET ORAL DAILY
Qty: 90 TABLET | Refills: 0 | Status: SHIPPED | OUTPATIENT
Start: 2025-01-20

## 2025-01-20 RX ORDER — MELOXICAM 7.5 MG/1
7.5 TABLET ORAL 2 TIMES DAILY
Qty: 60 TABLET | Refills: 2 | Status: SHIPPED | OUTPATIENT
Start: 2025-01-20

## 2025-01-20 RX ORDER — ROPINIROLE 0.5 MG/1
0.5 TABLET, FILM COATED ORAL NIGHTLY
Qty: 90 TABLET | Refills: 0 | Status: SHIPPED | OUTPATIENT
Start: 2025-01-20

## 2025-03-06 DIAGNOSIS — G25.81 RLS (RESTLESS LEGS SYNDROME): ICD-10-CM

## 2025-03-06 DIAGNOSIS — F41.1 GAD (GENERALIZED ANXIETY DISORDER): ICD-10-CM

## 2025-03-06 RX ORDER — ROPINIROLE 0.5 MG/1
0.5 TABLET, FILM COATED ORAL NIGHTLY
Qty: 90 TABLET | Refills: 1 | Status: SHIPPED | OUTPATIENT
Start: 2025-03-06

## 2025-03-06 NOTE — TELEPHONE ENCOUNTER
Name of Medication(s) Requested:  Requested Prescriptions     Pending Prescriptions Disp Refills    rOPINIRole (REQUIP) 0.5 MG tablet 90 tablet 1     Sig: Take 1 tablet by mouth at bedtime TAKE ONE TABLET BY MOUTH EVERY EVENING AT BEDTIME       Medication is on current medication list Yes    Dosage and directions were verified? Yes    Quantity verified: 90 day supply     Pharmacy Verified?  Yes    Last Appointment:  11/27/2024    Future appts:  Future Appointments   Date Time Provider Department Center   3/28/2025  1:45 PM Dru Irvin DO [facility]        (If no appt send self scheduling link. .REFILLAPPT)  Scheduling request sent?     [] Yes  [x] No    Does patient need updated?  [] Yes  [x] No

## 2025-03-06 NOTE — TELEPHONE ENCOUNTER
Name of Medication(s) Requested:  Requested Prescriptions     Pending Prescriptions Disp Refills    sertraline (ZOLOFT) 50 MG tablet 135 tablet 1     Sig: Take 1.5 tablets by mouth daily       Medication is on current medication list Yes    Dosage and directions were verified? Yes    Quantity verified: 90 day supply     Pharmacy Verified?  Yes    Last Appointment:  11/27/2024    Future appts:  Future Appointments   Date Time Provider Department Center   3/28/2025  1:45 PM Dru Irvin, DO [facility]        (If no appt send self scheduling link. .REFILLAPPT)  Scheduling request sent?     [] Yes  [x] No    Does patient need updated?  [] Yes  [x] No

## 2025-03-13 ENCOUNTER — OFFICE VISIT (OUTPATIENT)
Dept: PRIMARY CARE CLINIC | Age: 65
End: 2025-03-13

## 2025-03-13 VITALS
BODY MASS INDEX: 23.74 KG/M2 | DIASTOLIC BLOOD PRESSURE: 64 MMHG | OXYGEN SATURATION: 96 % | WEIGHT: 147 LBS | TEMPERATURE: 97.5 F | SYSTOLIC BLOOD PRESSURE: 122 MMHG | HEART RATE: 73 BPM

## 2025-03-13 DIAGNOSIS — E03.9 ACQUIRED HYPOTHYROIDISM: ICD-10-CM

## 2025-03-13 DIAGNOSIS — G25.81 RLS (RESTLESS LEGS SYNDROME): ICD-10-CM

## 2025-03-13 DIAGNOSIS — F41.1 GAD (GENERALIZED ANXIETY DISORDER): ICD-10-CM

## 2025-03-13 DIAGNOSIS — M25.512 CHRONIC LEFT SHOULDER PAIN: ICD-10-CM

## 2025-03-13 DIAGNOSIS — G89.29 CHRONIC RIGHT SHOULDER PAIN: Primary | ICD-10-CM

## 2025-03-13 DIAGNOSIS — M25.511 CHRONIC RIGHT SHOULDER PAIN: Primary | ICD-10-CM

## 2025-03-13 DIAGNOSIS — G89.29 CHRONIC LEFT SHOULDER PAIN: ICD-10-CM

## 2025-03-13 RX ORDER — METHYLPREDNISOLONE ACETATE 80 MG/ML
80 INJECTION, SUSPENSION INTRA-ARTICULAR; INTRALESIONAL; INTRAMUSCULAR; SOFT TISSUE ONCE
Status: COMPLETED | OUTPATIENT
Start: 2025-03-13 | End: 2025-03-13

## 2025-03-13 RX ORDER — LEVOTHYROXINE SODIUM 137 MCG
137 TABLET ORAL DAILY
Qty: 90 TABLET | Refills: 0 | Status: SHIPPED | OUTPATIENT
Start: 2025-03-13

## 2025-03-13 RX ORDER — IBUPROFEN 600 MG/1
600 TABLET, FILM COATED ORAL EVERY 8 HOURS PRN
Qty: 30 TABLET | Refills: 1 | Status: SHIPPED | OUTPATIENT
Start: 2025-03-13

## 2025-03-13 RX ORDER — ROPINIROLE 0.5 MG/1
0.5 TABLET, FILM COATED ORAL NIGHTLY
Qty: 90 TABLET | Refills: 1 | Status: SHIPPED | OUTPATIENT
Start: 2025-03-13

## 2025-03-13 RX ADMIN — METHYLPREDNISOLONE ACETATE 80 MG: 80 INJECTION, SUSPENSION INTRA-ARTICULAR; INTRALESIONAL; INTRAMUSCULAR; SOFT TISSUE at 16:42

## 2025-03-13 SDOH — ECONOMIC STABILITY: FOOD INSECURITY: WITHIN THE PAST 12 MONTHS, YOU WORRIED THAT YOUR FOOD WOULD RUN OUT BEFORE YOU GOT MONEY TO BUY MORE.: NEVER TRUE

## 2025-03-13 SDOH — ECONOMIC STABILITY: FOOD INSECURITY: WITHIN THE PAST 12 MONTHS, THE FOOD YOU BOUGHT JUST DIDN'T LAST AND YOU DIDN'T HAVE MONEY TO GET MORE.: NEVER TRUE

## 2025-03-13 ASSESSMENT — PATIENT HEALTH QUESTIONNAIRE - PHQ9
2. FEELING DOWN, DEPRESSED OR HOPELESS: NOT AT ALL
1. LITTLE INTEREST OR PLEASURE IN DOING THINGS: NOT AT ALL
SUM OF ALL RESPONSES TO PHQ QUESTIONS 1-9: 0

## 2025-03-13 NOTE — PROGRESS NOTES
Meghan Le, a female of 64 y.o. came to the office 3/13/2025.     Patient Active Problem List   Diagnosis    Mild persistent asthma without complication    Hypothyroidism    JACEY (generalized anxiety disorder)    Vitamin D deficiency    Psoriasis          Shoulder Pain   The pain is present in the left shoulder and right shoulder. This is a recurrent (but now left shoulder hurting over past wk since did cleaning of house.) problem. Treatments tried: exercising at [email or web address].   Anxiety  Presents for follow-up (doing well. exercise helps. retired Jan 1 st. doesn't miss work now that she's retired.) visit. Patient reports no decreased concentration, depressed mood, excessive worry, insomnia, irritability, nervous/anxious behavior, obsessions or panic.       Thyroid Problem  Presents for follow-up visit. Patient reports no anxiety, depressed mood, fatigue, weight gain or weight loss.    RLS: improved with Requip    Allergies   Allergen Reactions    Sulfa Antibiotics Other (See Comments)     Blisters on hands, voiced patient    Pcn [Penicillins] Rash       Current Outpatient Medications on File Prior to Visit   Medication Sig Dispense Refill    fluticasone furoate-vilanterol (BREO ELLIPTA) 200-25 MCG/ACT AEPB inhaler USE 1 INHALATION BY MOUTH DAILY 180 each 1    albuterol sulfate HFA (PROVENTIL;VENTOLIN;PROAIR) 108 (90 Base) MCG/ACT inhaler USE 2 INHALATIONS BY MOUTH EVERY 6 HOURS AS NEEDED FOR WHEEZING 20.1 g 3    triamcinolone (KENALOG) 0.1 % ointment       valACYclovir (VALTREX) 1 g tablet TAKE 2 TABLETS BY MOUTH TWICE  DAILY FOR 1 DAY (Patient not taking: Reported on 11/27/2024) 20 tablet 1    fluticasone-salmeterol (ADVAIR DISKUS) 100-50 MCG/ACT AEPB diskus inhaler USE 1 INHALATION BY MOUTH TWICE  DAILY (Patient not taking: Reported on 11/27/2024) 180 each 1    albuterol (PROVENTIL) (2.5 MG/3ML) 0.083% nebulizer solution Take 3 mLs by nebulization every 6 hours as needed for Wheezing 60 each 0    Nebulizers

## 2025-03-28 ENCOUNTER — OFFICE VISIT (OUTPATIENT)
Dept: PRIMARY CARE CLINIC | Age: 65
End: 2025-03-28
Payer: COMMERCIAL

## 2025-03-28 VITALS
SYSTOLIC BLOOD PRESSURE: 116 MMHG | BODY MASS INDEX: 23.63 KG/M2 | TEMPERATURE: 97.1 F | WEIGHT: 147 LBS | HEIGHT: 66 IN | OXYGEN SATURATION: 98 % | HEART RATE: 81 BPM | DIASTOLIC BLOOD PRESSURE: 80 MMHG | RESPIRATION RATE: 18 BRPM

## 2025-03-28 DIAGNOSIS — G89.29 CHRONIC LEFT SHOULDER PAIN: Primary | ICD-10-CM

## 2025-03-28 DIAGNOSIS — L30.9 DERMATITIS: ICD-10-CM

## 2025-03-28 DIAGNOSIS — M25.512 CHRONIC LEFT SHOULDER PAIN: Primary | ICD-10-CM

## 2025-03-28 DIAGNOSIS — M25.511 CHRONIC RIGHT SHOULDER PAIN: ICD-10-CM

## 2025-03-28 DIAGNOSIS — G89.29 CHRONIC RIGHT SHOULDER PAIN: ICD-10-CM

## 2025-03-28 PROCEDURE — 99213 OFFICE O/P EST LOW 20 MIN: CPT | Performed by: FAMILY MEDICINE

## 2025-03-28 RX ORDER — IBUPROFEN 800 MG/1
800 TABLET, FILM COATED ORAL EVERY 8 HOURS PRN
Qty: 30 TABLET | Refills: 1 | Status: SHIPPED | OUTPATIENT
Start: 2025-03-28

## 2025-03-28 RX ORDER — CLOBETASOL PROPIONATE 0.5 MG/G
OINTMENT TOPICAL
Qty: 15 G | Refills: 0 | Status: SHIPPED | OUTPATIENT
Start: 2025-03-28

## 2025-03-28 ASSESSMENT — ENCOUNTER SYMPTOMS
HAIR LOSS: 0
DIARRHEA: 0
CONSTIPATION: 0

## 2025-03-28 NOTE — PROGRESS NOTES
Meghan Le, a female of 64 y.o. came to the office 3/28/2025.     Patient Active Problem List   Diagnosis    Mild persistent asthma without complication    Hypothyroidism    JACEY (generalized anxiety disorder)    Vitamin D deficiency    Psoriasis          Thyroid Problem  Presents for follow-up visit. Patient reports no anxiety, constipation, depressed mood, diarrhea, dry skin, hair loss, nail problem, palpitations, tremors, weight gain or weight loss. The symptoms have been stable.   Skin Problem  This is a recurrent problem. The current episode started more than 1 year ago. The problem has been waxing and waning since onset. The affected locations include the right fingers. The rash is characterized by redness, peeling and itchiness. She was exposed to nothing (but gets every winter with dry heat.). Pertinent negatives include no diarrhea. Past treatments include topical steroids (triamciniolone cm). The treatment provided mild relief.    shoulder pain: no improvement with left shoulder jt injection. Having b/l upper arm pain. No N/T in arms. Sees ChiropractorEj. Occas neck pain. X ray has shown oa in cerv spine C 5. Ibu 600 mg helps somewhat.        Allergies   Allergen Reactions    Sulfa Antibiotics Other (See Comments)     Blisters on hands, voiced patient    Pcn [Penicillins] Rash       Current Outpatient Medications on File Prior to Visit   Medication Sig Dispense Refill    SYNTHROID 137 MCG tablet Take 1 tablet by mouth Daily 90 tablet 0    rOPINIRole (REQUIP) 0.5 MG tablet Take 1 tablet by mouth at bedtime TAKE ONE TABLET BY MOUTH EVERY EVENING AT BEDTIME 90 tablet 1    sertraline (ZOLOFT) 50 MG tablet Take 1.5 tablets by mouth daily 135 tablet 1    fluticasone furoate-vilanterol (BREO ELLIPTA) 200-25 MCG/ACT AEPB inhaler USE 1 INHALATION BY MOUTH DAILY (Patient taking differently: 1 puff as needed) 180 each 1    albuterol sulfate HFA (PROVENTIL;VENTOLIN;PROAIR) 108 (90 Base) MCG/ACT inhaler USE 2

## 2025-07-20 DIAGNOSIS — E03.9 ACQUIRED HYPOTHYROIDISM: ICD-10-CM

## 2025-07-21 RX ORDER — LEVOTHYROXINE SODIUM 137 MCG
137 TABLET ORAL DAILY
Qty: 90 TABLET | Refills: 0 | Status: SHIPPED | OUTPATIENT
Start: 2025-07-21

## 2025-08-22 ENCOUNTER — OFFICE VISIT (OUTPATIENT)
Dept: PRIMARY CARE CLINIC | Age: 65
End: 2025-08-22
Payer: COMMERCIAL

## 2025-08-22 VITALS
OXYGEN SATURATION: 95 % | HEART RATE: 84 BPM | TEMPERATURE: 97.8 F | BODY MASS INDEX: 23.9 KG/M2 | RESPIRATION RATE: 17 BRPM | SYSTOLIC BLOOD PRESSURE: 134 MMHG | WEIGHT: 148 LBS | DIASTOLIC BLOOD PRESSURE: 74 MMHG

## 2025-08-22 DIAGNOSIS — E78.49 OTHER HYPERLIPIDEMIA: ICD-10-CM

## 2025-08-22 DIAGNOSIS — Z01.818 PRE-OP EXAM: Primary | ICD-10-CM

## 2025-08-22 DIAGNOSIS — F41.1 GAD (GENERALIZED ANXIETY DISORDER): ICD-10-CM

## 2025-08-22 DIAGNOSIS — E03.9 ACQUIRED HYPOTHYROIDISM: ICD-10-CM

## 2025-08-22 DIAGNOSIS — G25.81 RLS (RESTLESS LEGS SYNDROME): ICD-10-CM

## 2025-08-22 PROCEDURE — 99214 OFFICE O/P EST MOD 30 MIN: CPT | Performed by: FAMILY MEDICINE

## 2025-08-22 PROCEDURE — 93000 ELECTROCARDIOGRAM COMPLETE: CPT | Performed by: FAMILY MEDICINE

## 2025-08-22 RX ORDER — LEVOTHYROXINE SODIUM 137 MCG
137 TABLET ORAL DAILY
Qty: 90 TABLET | Refills: 0 | Status: CANCELLED | OUTPATIENT
Start: 2025-08-22

## 2025-08-22 RX ORDER — ROPINIROLE 0.5 MG/1
0.5 TABLET, FILM COATED ORAL NIGHTLY
Qty: 90 TABLET | Refills: 1 | Status: SHIPPED | OUTPATIENT
Start: 2025-08-22

## 2025-08-22 ASSESSMENT — ENCOUNTER SYMPTOMS
CONSTIPATION: 0
DIARRHEA: 0
HAIR LOSS: 0

## 2025-08-25 DIAGNOSIS — E78.49 OTHER HYPERLIPIDEMIA: ICD-10-CM

## 2025-08-25 DIAGNOSIS — F41.1 GAD (GENERALIZED ANXIETY DISORDER): ICD-10-CM

## 2025-08-25 DIAGNOSIS — E03.9 ACQUIRED HYPOTHYROIDISM: ICD-10-CM

## 2025-08-25 LAB
ALBUMIN: 4.2 G/DL (ref 3.5–5.2)
ALP BLD-CCNC: 84 U/L (ref 35–104)
ALT SERPL-CCNC: 18 U/L (ref 0–35)
ANION GAP SERPL CALCULATED.3IONS-SCNC: 11 MMOL/L (ref 7–16)
AST SERPL-CCNC: 23 U/L (ref 0–35)
BILIRUB SERPL-MCNC: 0.5 MG/DL (ref 0–1.2)
BUN BLDV-MCNC: 17 MG/DL (ref 8–23)
CALCIUM SERPL-MCNC: 9.4 MG/DL (ref 8.8–10.2)
CHLORIDE BLD-SCNC: 106 MMOL/L (ref 98–107)
CHOLESTEROL, TOTAL: 171 MG/DL
CO2: 25 MMOL/L (ref 22–29)
CREAT SERPL-MCNC: 0.6 MG/DL (ref 0.5–1)
GFR, ESTIMATED: >90 ML/MIN/1.73M2
GLUCOSE BLD-MCNC: 74 MG/DL (ref 74–99)
HCT VFR BLD CALC: 42.3 % (ref 34–48)
HDLC SERPL-MCNC: 53 MG/DL
HEMOGLOBIN: 13.4 G/DL (ref 11.5–15.5)
LDL CHOLESTEROL: 95 MG/DL
MCH RBC QN AUTO: 29.8 PG (ref 26–35)
MCHC RBC AUTO-ENTMCNC: 31.7 G/DL (ref 32–34.5)
MCV RBC AUTO: 94 FL (ref 80–99.9)
PDW BLD-RTO: 11.9 % (ref 11.5–15)
PLATELET # BLD: 283 K/UL (ref 130–450)
PMV BLD AUTO: 11.2 FL (ref 7–12)
POTASSIUM SERPL-SCNC: 4.1 MMOL/L (ref 3.5–5.1)
RBC # BLD: 4.5 M/UL (ref 3.5–5.5)
SODIUM BLD-SCNC: 142 MMOL/L (ref 136–145)
TOTAL PROTEIN: 6.3 G/DL (ref 6.4–8.3)
TRIGL SERPL-MCNC: 118 MG/DL
TSH SERPL DL<=0.05 MIU/L-ACNC: 1.78 UIU/ML (ref 0.27–4.2)
VLDLC SERPL CALC-MCNC: 24 MG/DL
WBC # BLD: 5.2 K/UL (ref 4.5–11.5)